# Patient Record
Sex: FEMALE | Race: WHITE | Employment: OTHER | URBAN - METROPOLITAN AREA
[De-identification: names, ages, dates, MRNs, and addresses within clinical notes are randomized per-mention and may not be internally consistent; named-entity substitution may affect disease eponyms.]

---

## 2020-07-17 ENCOUNTER — OFFICE VISIT (OUTPATIENT)
Dept: NEUROLOGY | Age: 20
End: 2020-07-17
Payer: COMMERCIAL

## 2020-07-17 VITALS — DIASTOLIC BLOOD PRESSURE: 70 MMHG | WEIGHT: 206 LBS | SYSTOLIC BLOOD PRESSURE: 106 MMHG

## 2020-07-17 PROBLEM — M79.10 MYALGIA: Status: ACTIVE | Noted: 2020-07-17

## 2020-07-17 PROBLEM — E72.59: Status: ACTIVE | Noted: 2020-07-17

## 2020-07-17 PROBLEM — M54.12 CERVICAL RADICULOPATHY: Status: ACTIVE | Noted: 2020-07-17

## 2020-07-17 PROBLEM — G44.221 CHRONIC TENSION-TYPE HEADACHE, INTRACTABLE: Status: ACTIVE | Noted: 2020-07-17

## 2020-07-17 PROCEDURE — G8421 BMI NOT CALCULATED: HCPCS | Performed by: PSYCHIATRY & NEUROLOGY

## 2020-07-17 PROCEDURE — G8427 DOCREV CUR MEDS BY ELIG CLIN: HCPCS | Performed by: PSYCHIATRY & NEUROLOGY

## 2020-07-17 PROCEDURE — 4004F PT TOBACCO SCREEN RCVD TLK: CPT | Performed by: PSYCHIATRY & NEUROLOGY

## 2020-07-17 PROCEDURE — 99204 OFFICE O/P NEW MOD 45 MIN: CPT | Performed by: PSYCHIATRY & NEUROLOGY

## 2020-07-17 RX ORDER — LITHIUM CARBONATE 300 MG/1
300 TABLET, FILM COATED, EXTENDED RELEASE ORAL 2 TIMES DAILY
COMMUNITY

## 2020-07-17 RX ORDER — DEXMETHYLPHENIDATE HYDROCHLORIDE 20 MG/1
20 CAPSULE, EXTENDED RELEASE ORAL DAILY
COMMUNITY

## 2020-07-17 RX ORDER — ESCITALOPRAM OXALATE 20 MG/1
20 TABLET ORAL DAILY
COMMUNITY

## 2020-07-17 ASSESSMENT — ENCOUNTER SYMPTOMS
SHORTNESS OF BREATH: 0
COLOR CHANGE: 0
TROUBLE SWALLOWING: 0
PHOTOPHOBIA: 0
CHOKING: 0
BACK PAIN: 0
NAUSEA: 0
VOMITING: 0

## 2020-07-17 NOTE — PROGRESS NOTES
Subjective:      Patient ID: Mariano Quiñones is a 23 y.o. female who presents today for:  Chief Complaint   Patient presents with    New Patient     numbness and pain in right side of boody cames and goes can carry anything oain in body and both shoulders all the time numbness starts in right shoulder then spreads through whole right side. HPI 42-year-old right-handed female who is here for evaluation of significant neck pain as well as shoulder girdle pain. She is having difficulty even carrying 10 pounds of weight appears going on for at least several months to a year. Patient was diagnosed with hyperprolactinemia the etiology of this is being defined. She reportedly had an MRI done at [facility] and there is no prolactinomas. This could be secondary to medications. She denies any muscle twitching or numbness or tingling of her extremities. She does have increased frequency of micturition though she reports that is her baseline. She has difficulty sleeping due to the pain. She has tension posterior headaches without any nausea vomiting. She is gained about 75 pounds in weight of recent over the years due to her hormonal issues. She has had significant breast enlargements as well. No galactorrhea has been described. Patient is seen here with her mother. Is no reports of injury or car accidents. She denies any major symptoms in her lower extremities just limited climb stairs. No past medical history on file. No past surgical history on file.   Social History     Socioeconomic History    Marital status: Single     Spouse name: Not on file    Number of children: Not on file    Years of education: Not on file    Highest education level: Not on file   Occupational History    Not on file   Social Needs    Financial resource strain: Not on file    Food insecurity     Worry: Not on file     Inability: Not on file    Transportation needs     Medical: Not on file     Non-medical: Not on file medications for this visit. Review of Systems   Constitutional: Negative for fever. HENT: Negative for ear pain, tinnitus and trouble swallowing. Eyes: Negative for photophobia and visual disturbance. Respiratory: Negative for choking and shortness of breath. Cardiovascular: Negative for chest pain and palpitations. Gastrointestinal: Negative for nausea and vomiting. Musculoskeletal: Negative for back pain, gait problem, joint swelling, myalgias, neck pain and neck stiffness. Skin: Negative for color change. Allergic/Immunologic: Negative for food allergies. Neurological: Negative for dizziness, tremors, seizures, syncope, facial asymmetry, speech difficulty, weakness, light-headedness, numbness and headaches. Psychiatric/Behavioral: Negative for behavioral problems, confusion, hallucinations and sleep disturbance. Objective:   /70 (Site: Left Upper Arm, Position: Sitting, Cuff Size: Large Adult)   Wt 206 lb (93.4 kg)     Physical Exam  Vitals signs reviewed. Eyes:      Pupils: Pupils are equal, round, and reactive to light. Neck:      Musculoskeletal: Normal range of motion. Cardiovascular:      Rate and Rhythm: Normal rate and regular rhythm. Heart sounds: No murmur. Pulmonary:      Effort: Pulmonary effort is normal.      Breath sounds: Normal breath sounds. Abdominal:      General: Bowel sounds are normal.   Musculoskeletal: Normal range of motion. Skin:     General: Skin is warm. Neurological:      Mental Status: She is alert and oriented to person, place, and time. Cranial Nerves: No cranial nerve deficit. Sensory: No sensory deficit. Motor: No abnormal muscle tone. Coordination: Coordination normal.      Deep Tendon Reflexes: Reflexes are normal and symmetric. Babinski sign absent on the right side. Babinski sign absent on the left side.    Psychiatric:         Mood and Affect: Mood normal.     Exam shows hyper HYP areflexia Placed This Encounter   Procedures    MRI Cervical Spine WO Contrast     Standing Status:   Future     Standing Expiration Date:   7/17/2021     Order Specific Question:   Reason for exam:     Answer:   r/o syrinx    CK     Standing Status:   Future     Standing Expiration Date:   7/17/2021    Lactic Acid, Plasma     Standing Status:   Future     Standing Expiration Date:   7/17/2021    Aldolase     Standing Status:   Future     Standing Expiration Date:   7/17/2021    High Sensitivity Crp     Standing Status:   Future     Standing Expiration Date:   7/17/2021    Sedimentation Rate     Standing Status:   Future     Standing Expiration Date:   7/17/2021    EMG     Standing Status:   Future     Standing Expiration Date:   9/15/2020     Order Specific Question:   Which body part? Answer:   upper/  myopathy     No orders of the defined types were placed in this encounter. Return in about 3 months (around 10/17/2020).       Mary Kay Francis MD

## 2020-08-31 ENCOUNTER — HOSPITAL ENCOUNTER (OUTPATIENT)
Dept: MRI IMAGING | Age: 20
Discharge: HOME | End: 2020-09-02
Payer: COMMERCIAL

## 2020-08-31 PROCEDURE — 72141 MRI NECK SPINE W/O DYE: CPT
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
## 2020-10-22 PROBLEM — R79.89 INCREASED PROLACTIN LEVEL: Status: ACTIVE | Noted: 2020-03-13

## 2020-10-22 PROBLEM — F32.9 MDD (MAJOR DEPRESSIVE DISORDER): Status: ACTIVE | Noted: 2018-01-07

## 2020-10-22 PROBLEM — R44.3 HALLUCINATIONS, UNSPECIFIED: Status: ACTIVE | Noted: 2018-01-07

## 2020-10-22 PROBLEM — E87.6 HYPOKALEMIA: Status: ACTIVE | Noted: 2020-03-10

## 2020-10-22 PROBLEM — F99 PSYCHIATRIC DISORDER: Status: ACTIVE | Noted: 2020-10-22

## 2020-10-22 PROBLEM — N89.8 VAGINAL DISCHARGE: Status: ACTIVE | Noted: 2020-10-22

## 2020-10-22 PROBLEM — R63.5 WEIGHT GAIN DUE TO MEDICATION: Status: ACTIVE | Noted: 2020-01-30

## 2020-10-22 PROBLEM — F12.10 CANNABIS ABUSE: Status: ACTIVE | Noted: 2020-03-13

## 2020-10-22 PROBLEM — F14.20 COCAINE DEPENDENCE, UNCOMPLICATED (HCC): Status: ACTIVE | Noted: 2018-01-07

## 2020-10-22 PROBLEM — T43.011A OVERDOSE OF TRICYCLIC ANTIDEPRESSANTS: Status: ACTIVE | Noted: 2020-03-10

## 2020-10-22 PROBLEM — T14.91XA SUICIDE ATTEMPT (HCC): Status: ACTIVE | Noted: 2017-08-06

## 2020-10-22 PROBLEM — F90.9 ATTENTION-DEFICIT HYPERACTIVITY DISORDER, UNSPECIFIED TYPE: Status: ACTIVE | Noted: 2017-08-06

## 2020-10-22 PROBLEM — M22.2X9 PATELLOFEMORAL STRESS SYNDROME: Status: ACTIVE | Noted: 2018-03-12

## 2020-10-22 PROBLEM — E03.9 HYPOTHYROIDISM: Status: ACTIVE | Noted: 2020-10-22

## 2020-10-22 PROBLEM — M25.50 PAIN IN JOINT: Status: ACTIVE | Noted: 2020-07-17

## 2020-10-22 PROBLEM — M25.361 OTHER INSTABILITY, RIGHT KNEE: Status: ACTIVE | Noted: 2020-10-22

## 2020-10-22 PROBLEM — F31.5 BIPOLAR I DISORDER, SEVERE, CURRENT OR MOST RECENT EPISODE DEPRESSED, WITH PSYCHOTIC FEATURES (HCC): Status: ACTIVE | Noted: 2018-01-07

## 2020-10-22 PROBLEM — R45.851 SUICIDAL IDEATIONS: Status: ACTIVE | Noted: 2018-01-07

## 2020-10-22 PROBLEM — F41.1 GENERALIZED ANXIETY DISORDER: Status: ACTIVE | Noted: 2018-12-05

## 2020-10-22 PROBLEM — M23.52 CHRONIC INSTABILITY OF KNEE, LEFT KNEE: Status: ACTIVE | Noted: 2018-03-12

## 2020-10-22 PROBLEM — M25.569 KNEE PAIN: Status: ACTIVE | Noted: 2020-10-22

## 2020-10-22 PROBLEM — M22.2X1 PATELLOFEMORAL DISORDERS, RIGHT KNEE: Status: ACTIVE | Noted: 2018-03-12

## 2020-10-22 PROBLEM — T42.6X2A: Status: ACTIVE | Noted: 2017-08-06

## 2020-10-22 PROBLEM — R82.81 PYURIA: Status: ACTIVE | Noted: 2020-03-14

## 2020-10-22 PROBLEM — E66.9 OBESITY, CLASS I, BMI 30-34.9: Status: ACTIVE | Noted: 2020-03-13

## 2020-10-22 PROBLEM — F31.9 BIPOLAR DISORDER, UNSPECIFIED (HCC): Status: ACTIVE | Noted: 2017-08-06

## 2020-10-22 PROBLEM — M22.2X2 PATELLOFEMORAL DISORDERS, LEFT KNEE: Status: ACTIVE | Noted: 2018-03-12

## 2020-10-22 PROBLEM — E83.39 HYPOPHOSPHATEMIA: Status: ACTIVE | Noted: 2020-03-11

## 2020-10-22 PROBLEM — T50.901A OVERDOSE: Status: ACTIVE | Noted: 2017-08-07

## 2020-10-22 PROBLEM — F60.3 BORDERLINE PERSONALITY DISORDER (HCC): Status: ACTIVE | Noted: 2019-04-19

## 2020-10-22 PROBLEM — M21.40 PES PLANUS: Status: ACTIVE | Noted: 2020-10-22

## 2020-10-22 PROBLEM — F31.5 BIPOLAR DISORD, CRNT EPSD DEPRESS, SEVERE, W PSYCH FEATURES (HCC): Status: ACTIVE | Noted: 2018-01-07

## 2020-10-22 PROBLEM — E22.1 HYPERPROLACTINEMIA (HCC): Status: ACTIVE | Noted: 2020-08-14

## 2020-10-22 PROBLEM — S93.409A SPRAIN OF ANKLE: Status: ACTIVE | Noted: 2020-10-22

## 2020-10-22 PROBLEM — M25.362 OTHER INSTABILITY, LEFT KNEE: Status: ACTIVE | Noted: 2020-10-22

## 2020-10-22 PROBLEM — M54.50 LOW BACK PAIN: Status: ACTIVE | Noted: 2020-10-22

## 2020-10-22 PROBLEM — F41.9 ANXIETY: Status: ACTIVE | Noted: 2018-01-07

## 2020-10-22 PROBLEM — D35.2 PROLACTINOMA (HCC): Status: ACTIVE | Noted: 2020-10-22

## 2020-10-22 PROBLEM — M25.369 UNSTABLE KNEE: Status: ACTIVE | Noted: 2020-10-22

## 2020-10-22 PROBLEM — T50.905A WEIGHT GAIN DUE TO MEDICATION: Status: ACTIVE | Noted: 2020-01-30

## 2020-10-22 PROBLEM — F12.20 CANNABIS DEPENDENCE, UNCOMPLICATED (HCC): Status: ACTIVE | Noted: 2018-01-07

## 2020-10-23 ENCOUNTER — OFFICE VISIT (OUTPATIENT)
Dept: NEUROLOGY | Age: 20
End: 2020-10-23
Payer: COMMERCIAL

## 2020-10-23 VITALS — WEIGHT: 203.8 LBS | DIASTOLIC BLOOD PRESSURE: 78 MMHG | SYSTOLIC BLOOD PRESSURE: 112 MMHG | HEART RATE: 78 BPM

## 2020-10-23 PROCEDURE — G8427 DOCREV CUR MEDS BY ELIG CLIN: HCPCS | Performed by: PSYCHIATRY & NEUROLOGY

## 2020-10-23 PROCEDURE — G8484 FLU IMMUNIZE NO ADMIN: HCPCS | Performed by: PSYCHIATRY & NEUROLOGY

## 2020-10-23 PROCEDURE — G8421 BMI NOT CALCULATED: HCPCS | Performed by: PSYCHIATRY & NEUROLOGY

## 2020-10-23 PROCEDURE — 99214 OFFICE O/P EST MOD 30 MIN: CPT | Performed by: PSYCHIATRY & NEUROLOGY

## 2020-10-23 PROCEDURE — 4004F PT TOBACCO SCREEN RCVD TLK: CPT | Performed by: PSYCHIATRY & NEUROLOGY

## 2020-10-23 RX ORDER — PROPRANOLOL HYDROCHLORIDE 10 MG/1
TABLET ORAL
COMMUNITY
Start: 2020-09-28

## 2020-10-23 RX ORDER — DIPHENOXYLATE HYDROCHLORIDE AND ATROPINE SULFATE 2.5; .025 MG/1; MG/1
TABLET ORAL
COMMUNITY
Start: 2020-09-21

## 2020-10-23 RX ORDER — ESCITALOPRAM OXALATE 10 MG/1
10 TABLET ORAL NIGHTLY
COMMUNITY
Start: 2020-10-12 | End: 2021-01-10

## 2020-10-23 RX ORDER — LIDOCAINE 50 MG/G
PATCH TOPICAL
COMMUNITY
Start: 2020-09-28

## 2020-10-23 RX ORDER — CABERGOLINE 0.5 MG/1
TABLET ORAL
COMMUNITY
Start: 2020-09-21

## 2020-10-23 RX ORDER — NAPROXEN 500 MG/1
TABLET ORAL
COMMUNITY
Start: 2020-09-21

## 2020-10-23 ASSESSMENT — ENCOUNTER SYMPTOMS
PHOTOPHOBIA: 0
BACK PAIN: 0
TROUBLE SWALLOWING: 0
VOMITING: 0
NAUSEA: 0
CHOKING: 0
SHORTNESS OF BREATH: 0
COLOR CHANGE: 0

## 2020-10-23 NOTE — PROGRESS NOTES
Subjective:      Patient ID: Blake Hair is a 23 y.o. female who presents today for:  Chief Complaint   Patient presents with    Follow-up     Patient needs to go over results for MRI. She is still having back pain toward the uper back. Right side of neck goes numb. She states that it is debilitating to her to the point where she is unable to drive self. She is trying to work out and it is hard for her. HPI 58-year-old right-handed female here for evaluation of significant neck pain. She has difficulty carrying 10 pounds in weight and appears to be an ongoing problem. She reportedly had an MRI at [facility] and there was no prolactinemia. Patient is gained 75 pounds in weight in recent years patient has significant breast enlargements. Since associated obtain MRI of the cervical spine which has some curvature issue but no canal stenosis is obtained and it does appear that she has gained stigmata of breast weight. She is couch is and when she plays it all pulls on her chest and the neck. Patient was on lithium which has not been discontinued  No past medical history on file. No past surgical history on file.   Social History     Socioeconomic History    Marital status: Single     Spouse name: Not on file    Number of children: Not on file    Years of education: Not on file    Highest education level: Not on file   Occupational History    Not on file   Social Needs    Financial resource strain: Not on file    Food insecurity     Worry: Not on file     Inability: Not on file    Transportation needs     Medical: Not on file     Non-medical: Not on file   Tobacco Use    Smoking status: Current Some Day Smoker    Smokeless tobacco: Never Used   Substance and Sexual Activity    Alcohol use: Not on file    Drug use: Not on file    Sexual activity: Not on file   Lifestyle    Physical activity     Days per week: Not on file     Minutes per session: Not on file    Stress: Not on file   Relationships  Social connections     Talks on phone: Not on file     Gets together: Not on file     Attends Baptism service: Not on file     Active member of club or organization: Not on file     Attends meetings of clubs or organizations: Not on file     Relationship status: Not on file    Intimate partner violence     Fear of current or ex partner: Not on file     Emotionally abused: Not on file     Physically abused: Not on file     Forced sexual activity: Not on file   Other Topics Concern    Not on file   Social History Narrative    Not on file     No family history on file. Allergies   Allergen Reactions    Codeine Nausea And Vomiting    Fluoxetine      Other reaction(s): suicidal    Lurasidone Other (See Comments)     Other reaction(s): over dose/suicidal  suicidal       Quetiapine Other (See Comments)    Sertraline      Other reaction(s): suicidal    Bupropion Other (See Comments)     Made her want her to kill herself    Other reaction(s): Other: See Comments  Made her want her to kill herself    Fluoxetine Hcl Other (See Comments)     causes depression per mom    Lamotrigine      Other reaction(s): Mental Status Change  Suicidal      Nortriptyline      Other reaction(s): Other: See Comments    Paroxetine     Paroxetine Hcl Other (See Comments)     Tardive dyskinesia    Sertraline Hcl Other (See Comments)     causes depression per mom       Current Outpatient Medications   Medication Sig Dispense Refill    naproxen (NAPROSYN) 500 MG tablet Take 1 tablet by mouth twice daily as needed (for pain). Take with food.  cabergoline (DOSTINEX) 0.5 MG tablet Take 1/2 tablet by mouth every Sunday and Wednesday.  lidocaine (LIDODERM) 5 % Place onto the skin      Multiple Vitamin (MULTI-VITAMINS) TABS Take 1 tablet by mouth once daily.       propranolol (INDERAL) 10 MG tablet Take by mouth      escitalopram (LEXAPRO) 10 MG tablet Take 10 mg by mouth nightly      Dexmethylphenidate HCl ER [facility] Motor: No abnormal muscle tone. Coordination: Coordination normal.      Deep Tendon Reflexes: Reflexes are normal and symmetric. Babinski sign absent on the right side. Babinski sign absent on the left side. Psychiatric:         Mood and Affect: Mood normal.         Mri Cervical Spine Wo Contrast    Result Date: 8/31/2020  MRI of the cervical spine HISTORY: Neck pain radiating to the mid thoracic spine. Right arm and neck numbness. COMPARISON: No prior imaging of the cervical spine available for correlation. TECHNIQUE: Sagittal T1, T2, and inversion recovery. Axial gradient echo and T2. Coronal T1. FINDINGS: Cervical spinal canal is at the lower limits of normal in size on a congenital basis. Cervical cord has a normal signal and normal bulging. Craniocervical junction is unremarkable without narrowing or cerebellar tonsillar ectopia. Dextroscoliosis of the cervical thoracic junction. There may be a levoscoliosis of the more inferior thoracic spine that is not included on the study. C2-3 (C2): Unremarkable with no disc degeneration, canal or foraminal narrowing. C3-4 (C3): Subtle disc bulging but otherwise unremarkable with no significant degenerative disc changes, canal or foraminal narrowing. C4-5 (C4): Subtle disc space narrowing. No significant desiccation. Tiny focus of increased signal at the posterior central margin of the minimally bulging disc compatible with trivial annular tear. No significant canal or foraminal stenosis. C5-6 (C5): Subtle disc space narrowing and posterior bulging without significant mass effect on the thecal sac, canal or foraminal stenosis. C6-7 (C6): Disc space height and degree of hydration maintained. Subtle slightly eccentric towards the right bulging of the disc without significant canal or foraminal stenosis. C7-T1 (C7): Disc space height maintained. No significant canal stenosis. Mild right greater than left facet joint arthrosis without significant foraminal narrowing. not have a pituitary enlargement. Next patient's gain is secondary to antipsychotic she has discontinued the lithium recommended that she be careful on this as she can have significant relapse with bipolar state. Plan:      No orders of the defined types were placed in this encounter. No orders of the defined types were placed in this encounter. No follow-ups on file.       Beth Rosas MD

## 2021-03-10 PROBLEM — Z86.59 HISTORY OF MENTAL DISORDER: Status: ACTIVE | Noted: 2021-03-10

## 2021-03-10 PROBLEM — N62 LARGE BREASTS: Status: ACTIVE | Noted: 2021-03-10

## 2021-03-10 PROBLEM — R53.1 ASTHENIA: Status: ACTIVE | Noted: 2021-03-10

## 2021-03-10 PROBLEM — T50.905A ADVERSE REACTION TO DRUG: Status: ACTIVE | Noted: 2017-08-07

## 2021-03-10 PROBLEM — M25.519 SHOULDER PAIN: Status: ACTIVE | Noted: 2021-03-10

## 2023-03-09 ENCOUNTER — OFFICE VISIT (OUTPATIENT)
Dept: PRIMARY CARE | Facility: CLINIC | Age: 23
End: 2023-03-09
Payer: COMMERCIAL

## 2023-03-09 VITALS
OXYGEN SATURATION: 97 % | WEIGHT: 172 LBS | SYSTOLIC BLOOD PRESSURE: 118 MMHG | HEART RATE: 102 BPM | BODY MASS INDEX: 25.4 KG/M2 | DIASTOLIC BLOOD PRESSURE: 66 MMHG | RESPIRATION RATE: 16 BRPM | TEMPERATURE: 97.7 F

## 2023-03-09 DIAGNOSIS — B00.1 COLD SORE: Primary | ICD-10-CM

## 2023-03-09 PROCEDURE — 1036F TOBACCO NON-USER: CPT | Performed by: NURSE PRACTITIONER

## 2023-03-09 PROCEDURE — 99213 OFFICE O/P EST LOW 20 MIN: CPT | Performed by: NURSE PRACTITIONER

## 2023-03-09 RX ORDER — CLONIDINE HYDROCHLORIDE 0.2 MG/1
0.2 TABLET ORAL NIGHTLY
COMMUNITY
Start: 2023-02-13

## 2023-03-09 RX ORDER — ESCITALOPRAM OXALATE 10 MG/1
TABLET ORAL
COMMUNITY

## 2023-03-09 RX ORDER — BETAMETHASONE DIPROPIONATE 0.5 MG/G
CREAM TOPICAL 2 TIMES DAILY
COMMUNITY
Start: 2021-10-21 | End: 2023-06-16

## 2023-03-09 RX ORDER — NAPROXEN 500 MG/1
TABLET ORAL
COMMUNITY

## 2023-03-09 RX ORDER — METHOCARBAMOL 500 MG/1
500 TABLET, FILM COATED ORAL 2 TIMES DAILY PRN
COMMUNITY
Start: 2022-06-01 | End: 2023-06-16 | Stop reason: ALTCHOICE

## 2023-03-09 RX ORDER — SPIRONOLACTONE 50 MG/1
1 TABLET, FILM COATED ORAL 2 TIMES DAILY
COMMUNITY
Start: 2023-02-08

## 2023-03-09 RX ORDER — LINACLOTIDE 290 UG/1
1 CAPSULE, GELATIN COATED ORAL DAILY
COMMUNITY
Start: 2021-04-28 | End: 2024-03-20 | Stop reason: SDUPTHER

## 2023-03-09 RX ORDER — CABERGOLINE 0.5 MG/1
0.5 TABLET ORAL
COMMUNITY

## 2023-03-09 RX ORDER — PANTOPRAZOLE SODIUM 40 MG/1
1 TABLET, DELAYED RELEASE ORAL 2 TIMES DAILY
COMMUNITY
Start: 2022-05-23 | End: 2023-06-16 | Stop reason: ALTCHOICE

## 2023-03-09 RX ORDER — ESCITALOPRAM OXALATE 20 MG/1
TABLET ORAL
COMMUNITY

## 2023-03-09 RX ORDER — DEXMETHYLPHENIDATE HYDROCHLORIDE 30 MG/1
1 CAPSULE, EXTENDED RELEASE ORAL DAILY
COMMUNITY
Start: 2023-02-24 | End: 2023-10-26

## 2023-03-09 RX ORDER — VALACYCLOVIR HYDROCHLORIDE 1 G/1
2000 TABLET, FILM COATED ORAL 2 TIMES DAILY
Qty: 4 TABLET | Refills: 0 | Status: SHIPPED | OUTPATIENT
Start: 2023-03-09 | End: 2023-03-16 | Stop reason: SDUPTHER

## 2023-03-09 RX ORDER — METFORMIN HYDROCHLORIDE 750 MG/1
750 TABLET, EXTENDED RELEASE ORAL 2 TIMES DAILY
COMMUNITY
Start: 2023-02-21

## 2023-03-09 RX ORDER — MULTIVITAMIN
1 TABLET ORAL DAILY
COMMUNITY
Start: 2020-02-20 | End: 2023-06-20 | Stop reason: SDUPTHER

## 2023-03-09 RX ORDER — BUSPIRONE HYDROCHLORIDE 10 MG/1
TABLET ORAL
COMMUNITY
End: 2023-06-16

## 2023-03-09 RX ORDER — ASENAPINE 5 MG/1
TABLET SUBLINGUAL
COMMUNITY

## 2023-03-09 ASSESSMENT — ENCOUNTER SYMPTOMS
SORE THROAT: 0
HEADACHES: 0
APPETITE CHANGE: 0
ACTIVITY CHANGE: 0
SINUS PAIN: 0
SINUS PRESSURE: 0
RHINORRHEA: 0

## 2023-03-09 NOTE — ASSESSMENT & PLAN NOTE
Oral antiviral given; Take as directed  Can use tylenol or motrin as needed for pain  Follow up with PCP if not improving

## 2023-03-09 NOTE — PROGRESS NOTES
Subjective   Patient ID: Ayesha Jean is a 23 y.o. female who presents for Rash.    L side of mouth  X3 weeks  Has hx of cold sores  Only 2nd flare up  Tried abreva; had an allergic reaction  Lala bees OTC; did not help  No fever or chills  2 days ago-feels sob  Hard taking a deep breath  Denies any cold symptoms  Denies any hx of asthma    Does not smoke or vape      Rash  This is a new problem. The current episode started 1 to 4 weeks ago. The affected locations include the lips. The rash is characterized by blistering. Pertinent negatives include no rhinorrhea or sore throat.        Review of Systems   Constitutional:  Negative for activity change and appetite change.   HENT:  Positive for mouth sores. Negative for postnasal drip, rhinorrhea, sinus pressure, sinus pain and sore throat.    Skin:  Positive for rash.   Neurological:  Negative for headaches.       Objective   /66   Pulse 102   Temp 36.5 °C (97.7 °F) (Temporal)   Resp 16   Wt 78 kg (172 lb)   SpO2 97%   BMI 25.40 kg/m²     Physical Exam  Vitals reviewed.   Constitutional:       Appearance: Normal appearance.   HENT:      Head: Normocephalic.      Right Ear: Tympanic membrane, ear canal and external ear normal.      Left Ear: Tympanic membrane, ear canal and external ear normal.      Nose: Nose normal.      Mouth/Throat:      Lips: Pink. Lesions present.      Mouth: Mucous membranes are moist.      Pharynx: Oropharynx is clear.      Comments: L mouth several vesicles  Some crusted over    Eyes:      Extraocular Movements: Extraocular movements intact.      Pupils: Pupils are equal, round, and reactive to light.   Cardiovascular:      Rate and Rhythm: Normal rate and regular rhythm.      Pulses: Normal pulses.      Heart sounds: Normal heart sounds.   Musculoskeletal:      Cervical back: Normal range of motion and neck supple.   Skin:     General: Skin is warm.   Neurological:      Mental Status: She is alert and oriented to person, place,  and time.   Psychiatric:         Mood and Affect: Mood normal.         Behavior: Behavior normal.         Assessment/Plan   Problem List Items Addressed This Visit          Infectious/Inflammatory    Cold sore - Primary     Oral antiviral given; Take as directed  Can use tylenol or motrin as needed for pain  Follow up with PCP if not improving           Relevant Medications    valACYclovir (Valtrex) 1 gram tablet

## 2023-03-16 ENCOUNTER — OFFICE VISIT (OUTPATIENT)
Dept: PRIMARY CARE | Facility: CLINIC | Age: 23
End: 2023-03-16
Payer: COMMERCIAL

## 2023-03-16 ENCOUNTER — TELEPHONE (OUTPATIENT)
Dept: PRIMARY CARE | Facility: CLINIC | Age: 23
End: 2023-03-16

## 2023-03-16 VITALS
RESPIRATION RATE: 14 BRPM | BODY MASS INDEX: 25.55 KG/M2 | HEIGHT: 69 IN | WEIGHT: 172.5 LBS | HEART RATE: 60 BPM | TEMPERATURE: 97.6 F | DIASTOLIC BLOOD PRESSURE: 70 MMHG | SYSTOLIC BLOOD PRESSURE: 104 MMHG

## 2023-03-16 DIAGNOSIS — B00.1 COLD SORE: ICD-10-CM

## 2023-03-16 PROCEDURE — 1036F TOBACCO NON-USER: CPT | Performed by: NURSE PRACTITIONER

## 2023-03-16 PROCEDURE — 99213 OFFICE O/P EST LOW 20 MIN: CPT | Performed by: NURSE PRACTITIONER

## 2023-03-16 RX ORDER — CLONIDINE HYDROCHLORIDE 0.1 MG/1
0.1 TABLET ORAL NIGHTLY
COMMUNITY
Start: 2022-10-17 | End: 2023-06-16 | Stop reason: ALTCHOICE

## 2023-03-16 RX ORDER — METFORMIN HYDROCHLORIDE 500 MG/1
500 TABLET, EXTENDED RELEASE ORAL
COMMUNITY
Start: 2022-08-16 | End: 2023-06-16 | Stop reason: ALTCHOICE

## 2023-03-16 RX ORDER — VALACYCLOVIR HYDROCHLORIDE 500 MG/1
500 TABLET, FILM COATED ORAL DAILY
Qty: 90 TABLET | Refills: 0 | Status: SHIPPED | OUTPATIENT
Start: 2023-03-16 | End: 2023-06-20 | Stop reason: SDUPTHER

## 2023-03-16 RX ORDER — LINACLOTIDE 145 UG/1
145 CAPSULE, GELATIN COATED ORAL
COMMUNITY
Start: 2022-04-24 | End: 2023-06-16 | Stop reason: ALTCHOICE

## 2023-03-16 ASSESSMENT — ENCOUNTER SYMPTOMS
EYE DISCHARGE: 0
EYE PAIN: 1
EYE ITCHING: 1
NECK PAIN: 0
HEADACHES: 1
VOMITING: 0
ABDOMINAL PAIN: 0
NAUSEA: 0
DIARRHEA: 0
WHEEZING: 0
FEVER: 0
EYE REDNESS: 0
SORE THROAT: 1
CONSTIPATION: 0
COUGH: 0

## 2023-03-16 NOTE — PROGRESS NOTES
"Subjective   Patient ID: Ayesha Jean is a 23 y.o. female who presents for Mouth Lesions.    Mouth Lesions   The current episode started more than 1 week ago. The onset was gradual. The problem occurs continuously. The problem has been unchanged. Nothing relieves the symptoms. Associated symptoms include eye itching, congestion, ear pain, headaches, mouth sores, sore throat, muscle aches and eye pain. Pertinent negatives include no fever, no abdominal pain, no constipation, no diarrhea, no nausea, no vomiting, no ear discharge, no neck pain, no neck stiffness, no cough, no wheezing, no rash, no eye discharge and no eye redness.        Review of Systems   Constitutional:  Negative for fever.   HENT:  Positive for congestion, ear pain, mouth sores and sore throat. Negative for ear discharge.    Eyes:  Positive for pain and itching. Negative for discharge and redness.   Respiratory:  Negative for cough and wheezing.    Gastrointestinal:  Negative for abdominal pain, constipation, diarrhea, nausea and vomiting.   Musculoskeletal:  Negative for neck pain.   Skin:  Negative for rash.   Neurological:  Positive for headaches.       Objective   /70 (BP Location: Left leg, Patient Position: Sitting, BP Cuff Size: Adult)   Pulse 60   Temp 36.4 °C (97.6 °F) (Temporal)   Resp 14   Ht 1.753 m (5' 9\")   Wt 78.2 kg (172 lb 8 oz)   BMI 25.47 kg/m²     Physical Exam    Assessment/Plan          "

## 2023-03-16 NOTE — TELEPHONE ENCOUNTER
[phone]  Patient was seen 3/9/23 for a cold sore.  States that it is healing with help from the medication that was given.  Said that it looks like it is now spreading to the other side of her mouth.  She has piercings on that side and she doesn't know what she should do.  Please give her a call back at the number above.

## 2023-03-16 NOTE — PROGRESS NOTES
"Subjective   Patient ID: Ayesha Jean is a 23 y.o. female who presents for Mouth Lesions.  HPI  [facility]  Review of Systems    Objective     /70 (BP Location: Left leg, Patient Position: Sitting, BP Cuff Size: Adult)   Pulse 60   Temp 36.4 °C (97.6 °F) (Temporal)   Resp 14   Ht 1.753 m (5' 9\")   Wt 78.2 kg (172 lb 8 oz)   BMI 25.47 kg/m²      Physical Exam    Assessment/Plan   Problem List Items Addressed This Visit          Infectious/Inflammatory    RESOLVED: Cold sore          "

## 2023-03-17 ENCOUNTER — APPOINTMENT (OUTPATIENT)
Dept: PRIMARY CARE | Facility: CLINIC | Age: 23
End: 2023-03-17
Payer: COMMERCIAL

## 2023-03-17 NOTE — PROGRESS NOTES
"Subjective   Patient ID: Ayesha Jean is a 23 y.o. female who presents for Mouth Lesions.    Pt. Is being seen for cold sores to lips that she feels are spreading.  She was seen last week and prescribed 2 days of Valtrex which she completed.  She now has new areas developing, pain when opening her mouth. She is also complaining of generalized malaise and body aches, no fever    Mouth Lesions   The current episode started more than 1 week ago. The onset was gradual. The problem occurs continuously. The problem has been unchanged. Nothing relieves the symptoms. Associated symptoms include eye itching, congestion, ear pain, headaches, sore throat, muscle aches and eye pain. Pertinent negatives include no fever, no abdominal pain, no constipation, no diarrhea, no nausea, no vomiting, no ear discharge, no neck pain, no neck stiffness, no cough, no wheezing, no rash, no eye discharge and no eye redness.        Review of Systems   Constitutional:  Negative for fever.   HENT:  Positive for congestion, ear pain, mouth sores and sore throat. Negative for ear discharge.    Eyes:  Positive for pain and itching. Negative for discharge and redness.   Respiratory:  Negative for cough and wheezing.    Gastrointestinal:  Negative for abdominal pain, constipation, diarrhea, nausea and vomiting.   Musculoskeletal:  Negative for neck pain.   Skin:  Negative for rash.   Neurological:  Positive for headaches.       Objective   /70 (BP Location: Left leg, Patient Position: Sitting, BP Cuff Size: Adult)   Pulse 60   Temp 36.4 °C (97.6 °F) (Temporal)   Resp 14   Ht 1.753 m (5' 9\")   Wt 78.2 kg (172 lb 8 oz)   BMI 25.47 kg/m²     Physical Exam  Constitutional:       General: She is not in acute distress.     Appearance: She is not ill-appearing.   HENT:      Head: Normocephalic and atraumatic.      Mouth/Throat:      Lips: Lesions present.      Mouth: Mucous membranes are moist.      Pharynx: Oropharynx is clear.   Eyes:      " Conjunctiva/sclera: Conjunctivae normal.      Pupils: Pupils are equal, round, and reactive to light.   Cardiovascular:      Rate and Rhythm: Normal rate and regular rhythm.      Pulses: Normal pulses.      Heart sounds: Normal heart sounds.   Pulmonary:      Effort: Pulmonary effort is normal. No respiratory distress.      Breath sounds: Normal breath sounds.   Abdominal:      General: Bowel sounds are normal.      Palpations: Abdomen is soft.      Tenderness: There is no abdominal tenderness.   Musculoskeletal:         General: No tenderness. Normal range of motion.      Cervical back: Normal range of motion and neck supple.   Skin:     General: Skin is warm and dry.      Findings: Rash present. Rash is pustular and vesicular.             Comments: Right side of lip   Neurological:      General: No focal deficit present.      Mental Status: She is alert and oriented to person, place, and time.   Psychiatric:         Mood and Affect: Mood normal.         Behavior: Behavior normal.         Thought Content: Thought content normal.         Judgment: Judgment normal.         Assessment/Plan   Problem List Items Addressed This Visit          Infectious/Inflammatory    RESOLVED: Cold sore    Relevant Medications    valACYclovir (Valtrex) 500 mg tablet

## 2023-03-17 NOTE — PATIENT INSTRUCTIONS
Pt started on 500 mg valtrex daily.  Pt. Instructed to call office if lesions do not resolve or are getting worse.  Educated on ways to prevent spreading. Follow-up with PCP in 3 months, or sooner if needed. Call the office if any problems or concerns in the meantime.

## 2023-03-19 PROBLEM — T50.905A MEDICATION REACTION: Status: ACTIVE | Noted: 2023-03-19

## 2023-03-19 PROBLEM — R35.0 INCREASED URINARY FREQUENCY: Status: ACTIVE | Noted: 2023-03-19

## 2023-03-19 PROBLEM — M75.50 SCAPULOTHORACIC BURSITIS: Status: ACTIVE | Noted: 2023-03-19

## 2023-03-19 PROBLEM — Z98.890 STATUS POST BILATERAL BREAST REDUCTION: Status: ACTIVE | Noted: 2023-03-19

## 2023-03-19 PROBLEM — G89.29 CHRONIC PAIN OF BOTH KNEES: Status: ACTIVE | Noted: 2023-03-19

## 2023-03-19 PROBLEM — R53.83 MALAISE AND FATIGUE: Status: ACTIVE | Noted: 2023-03-19

## 2023-03-19 PROBLEM — R63.5 ABNORMAL WEIGHT GAIN: Status: ACTIVE | Noted: 2023-03-19

## 2023-03-19 PROBLEM — K59.09 CHRONIC CONSTIPATION: Status: ACTIVE | Noted: 2023-03-19

## 2023-03-19 PROBLEM — L25.9 CONTACT DERMATITIS: Status: ACTIVE | Noted: 2023-03-19

## 2023-03-19 PROBLEM — M54.6 THORACIC BACK PAIN: Status: ACTIVE | Noted: 2023-03-19

## 2023-03-19 PROBLEM — M25.512 BILATERAL SHOULDER PAIN: Status: ACTIVE | Noted: 2023-03-19

## 2023-03-19 PROBLEM — R06.00 DYSPNEA: Status: ACTIVE | Noted: 2023-03-19

## 2023-03-19 PROBLEM — F31.9 BIPOLAR DEPRESSION (MULTI): Status: ACTIVE | Noted: 2023-03-19

## 2023-03-19 PROBLEM — E03.9 HYPOTHYROIDISM: Status: ACTIVE | Noted: 2023-03-19

## 2023-03-19 PROBLEM — M21.40 FLAT FOOT: Status: ACTIVE | Noted: 2023-03-19

## 2023-03-19 PROBLEM — E28.2 PCOS (POLYCYSTIC OVARIAN SYNDROME): Status: ACTIVE | Noted: 2023-03-19

## 2023-03-19 PROBLEM — R09.82 POST-NASAL DRIP: Status: ACTIVE | Noted: 2023-03-19

## 2023-03-19 PROBLEM — J06.9 URI (UPPER RESPIRATORY INFECTION): Status: ACTIVE | Noted: 2023-03-19

## 2023-03-19 PROBLEM — U07.1 COVID-19: Status: ACTIVE | Noted: 2023-03-19

## 2023-03-19 PROBLEM — F43.10 PTSD (POST-TRAUMATIC STRESS DISORDER): Status: ACTIVE | Noted: 2023-03-19

## 2023-03-19 PROBLEM — R39.9 UTI SYMPTOMS: Status: ACTIVE | Noted: 2023-03-19

## 2023-03-19 PROBLEM — M25.369 KNEE INSTABILITY: Status: ACTIVE | Noted: 2023-03-19

## 2023-03-19 PROBLEM — M22.2X9 PFS (PATELLOFEMORAL SYNDROME): Status: ACTIVE | Noted: 2023-03-19

## 2023-03-19 PROBLEM — S99.919A ANKLE INJURY: Status: ACTIVE | Noted: 2023-03-19

## 2023-03-19 PROBLEM — M25.562 CHRONIC PAIN OF BOTH KNEES: Status: ACTIVE | Noted: 2023-03-19

## 2023-03-19 PROBLEM — Z04.9 CONDITION NOT FOUND: Status: ACTIVE | Noted: 2023-03-19

## 2023-03-19 PROBLEM — M25.50 JOINT PAIN: Status: ACTIVE | Noted: 2023-03-19

## 2023-03-19 PROBLEM — D35.2 PROLACTINOMA (MULTI): Status: ACTIVE | Noted: 2023-03-19

## 2023-03-19 PROBLEM — M25.362 PATELLAR INSTABILITY OF LEFT KNEE: Status: ACTIVE | Noted: 2023-03-19

## 2023-03-19 PROBLEM — R53.1 WEAKNESS: Status: ACTIVE | Noted: 2023-03-19

## 2023-03-19 PROBLEM — S13.9XXA CERVICAL SPRAIN: Status: ACTIVE | Noted: 2023-03-19

## 2023-03-19 PROBLEM — B34.9 VIRAL SYNDROME: Status: ACTIVE | Noted: 2023-03-19

## 2023-03-19 PROBLEM — M25.361 PATELLAR INSTABILITY OF RIGHT KNEE: Status: ACTIVE | Noted: 2023-03-19

## 2023-03-19 PROBLEM — F90.9 ATTENTION DEFICIT HYPERACTIVITY DISORDER (ADHD): Status: ACTIVE | Noted: 2023-03-19

## 2023-03-19 PROBLEM — M79.10 MYALGIA: Status: ACTIVE | Noted: 2023-03-19

## 2023-03-19 PROBLEM — R30.0 DYSURIA: Status: ACTIVE | Noted: 2023-03-19

## 2023-03-19 PROBLEM — R51.9 GENERALIZED HEADACHES: Status: ACTIVE | Noted: 2023-03-19

## 2023-03-19 PROBLEM — S93.409A ANKLE SPRAIN: Status: ACTIVE | Noted: 2023-03-19

## 2023-03-19 PROBLEM — R53.81 MALAISE AND FATIGUE: Status: ACTIVE | Noted: 2023-03-19

## 2023-03-19 PROBLEM — M25.511 BILATERAL SHOULDER PAIN: Status: ACTIVE | Noted: 2023-03-19

## 2023-03-19 PROBLEM — M54.2 NECK PAIN: Status: ACTIVE | Noted: 2023-03-19

## 2023-03-19 PROBLEM — B34.9 VIRAL SYNDROME: Status: RESOLVED | Noted: 2023-03-19 | Resolved: 2023-03-19

## 2023-03-19 PROBLEM — M25.561 CHRONIC PAIN OF BOTH KNEES: Status: ACTIVE | Noted: 2023-03-19

## 2023-03-19 PROBLEM — A64 SEXUALLY TRANSMITTED DISEASE (STD): Status: ACTIVE | Noted: 2023-03-19

## 2023-03-19 PROBLEM — M54.50 LOWER BACK PAIN: Status: ACTIVE | Noted: 2023-03-19

## 2023-03-19 PROBLEM — J02.9 SORE THROAT: Status: ACTIVE | Noted: 2023-03-19

## 2023-03-19 PROBLEM — M47.22 OSTEOARTHRITIS OF SPINE WITH RADICULOPATHY, CERVICAL REGION: Status: ACTIVE | Noted: 2023-03-19

## 2023-03-19 PROBLEM — R09.81 NASAL CONGESTION: Status: ACTIVE | Noted: 2023-03-19

## 2023-03-19 PROBLEM — N89.8 VAGINAL DISCHARGE: Status: ACTIVE | Noted: 2023-03-19

## 2023-03-19 RX ORDER — LIDOCAINE 50 MG/G
1 PATCH TOPICAL DAILY
COMMUNITY

## 2023-03-19 RX ORDER — POLYETHYLENE GLYCOL 3350 17 G/17G
17 POWDER, FOR SOLUTION ORAL 2 TIMES DAILY PRN
COMMUNITY

## 2023-03-19 RX ORDER — ERGOCALCIFEROL 1.25 MG/1
1 CAPSULE ORAL
COMMUNITY

## 2023-03-19 RX ORDER — FLUTICASONE PROPIONATE 50 MCG
2 SPRAY, SUSPENSION (ML) NASAL DAILY
COMMUNITY
Start: 2022-10-10

## 2023-03-19 RX ORDER — CLOTRIMAZOLE 1 %
1 CREAM (GRAM) TOPICAL 2 TIMES DAILY PRN
COMMUNITY
Start: 2021-10-21 | End: 2023-06-16 | Stop reason: ALTCHOICE

## 2023-03-19 RX ORDER — PSYLLIUM HUSK 100 %
POWDER (GRAM) MISCELLANEOUS DAILY
COMMUNITY
End: 2023-06-16 | Stop reason: ALTCHOICE

## 2023-03-27 ENCOUNTER — TELEPHONE (OUTPATIENT)
Dept: PRIMARY CARE | Facility: CLINIC | Age: 23
End: 2023-03-27

## 2023-03-27 ENCOUNTER — OFFICE VISIT (OUTPATIENT)
Dept: PRIMARY CARE | Facility: CLINIC | Age: 23
End: 2023-03-27
Payer: COMMERCIAL

## 2023-03-27 VITALS
HEART RATE: 85 BPM | BODY MASS INDEX: 25.25 KG/M2 | TEMPERATURE: 98.9 F | SYSTOLIC BLOOD PRESSURE: 110 MMHG | OXYGEN SATURATION: 97 % | WEIGHT: 171 LBS | DIASTOLIC BLOOD PRESSURE: 74 MMHG

## 2023-03-27 DIAGNOSIS — L01.00 IMPETIGO: ICD-10-CM

## 2023-03-27 DIAGNOSIS — B00.1 RECURRENT COLD SORES: Primary | ICD-10-CM

## 2023-03-27 PROBLEM — E22.1 HYPERPROLACTINEMIA (MULTI): Status: ACTIVE | Noted: 2020-08-14

## 2023-03-27 PROBLEM — E87.6 HYPOKALEMIA: Status: ACTIVE | Noted: 2020-03-10

## 2023-03-27 PROBLEM — T50.905A WEIGHT GAIN DUE TO MEDICATION: Status: ACTIVE | Noted: 2020-01-30

## 2023-03-27 PROBLEM — F60.3 BORDERLINE PERSONALITY DISORDER (MULTI): Status: ACTIVE | Noted: 2019-04-19

## 2023-03-27 PROBLEM — E83.39 HYPOPHOSPHATEMIA: Status: ACTIVE | Noted: 2020-03-11

## 2023-03-27 PROBLEM — R82.81 PYURIA: Status: ACTIVE | Noted: 2020-03-14

## 2023-03-27 PROBLEM — F14.20 COCAINE DEPENDENCE, UNCOMPLICATED (MULTI): Status: ACTIVE | Noted: 2018-01-07
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
## 2023-03-27 PROBLEM — M25.519 SHOULDER PAIN: Status: ACTIVE | Noted: 2021-03-10

## 2023-03-27 PROBLEM — R45.851 SUICIDAL IDEATIONS: Status: ACTIVE | Noted: 2018-01-07

## 2023-03-27 PROBLEM — E66.9 OBESITY, CLASS I, BMI 30-34.9: Status: ACTIVE | Noted: 2020-03-13

## 2023-03-27 PROBLEM — F12.20 CANNABIS DEPENDENCE, UNCOMPLICATED (MULTI): Status: ACTIVE | Noted: 2018-01-07

## 2023-03-27 PROBLEM — Z86.59 HISTORY OF MENTAL DISORDER: Status: ACTIVE | Noted: 2021-03-10

## 2023-03-27 PROBLEM — E66.811 OBESITY, CLASS I, BMI 30-34.9: Status: ACTIVE | Noted: 2020-03-13

## 2023-03-27 PROBLEM — R44.3 HALLUCINATIONS, UNSPECIFIED: Status: ACTIVE | Noted: 2018-01-07

## 2023-03-27 PROBLEM — F12.10 CANNABIS ABUSE: Status: ACTIVE | Noted: 2020-03-13

## 2023-03-27 PROBLEM — G44.221 CHRONIC TENSION-TYPE HEADACHE, INTRACTABLE: Status: ACTIVE | Noted: 2020-07-17

## 2023-03-27 PROBLEM — N62 LARGE BREASTS: Status: ACTIVE | Noted: 2021-03-10

## 2023-03-27 PROBLEM — F31.5: Status: ACTIVE | Noted: 2018-01-07

## 2023-03-27 PROBLEM — F41.9 ANXIETY: Status: ACTIVE | Noted: 2018-01-07

## 2023-03-27 PROBLEM — F99 PSYCHIATRIC DISORDER: Status: ACTIVE | Noted: 2020-10-22

## 2023-03-27 PROBLEM — E72.59: Status: ACTIVE | Noted: 2020-07-17

## 2023-03-27 PROBLEM — F32.9 MDD (MAJOR DEPRESSIVE DISORDER): Status: ACTIVE | Noted: 2018-01-07

## 2023-03-27 PROBLEM — R63.5 WEIGHT GAIN DUE TO MEDICATION: Status: ACTIVE | Noted: 2020-01-30

## 2023-03-27 PROBLEM — S93.409A SPRAIN OF ANKLE: Status: ACTIVE | Noted: 2020-10-22

## 2023-03-27 PROBLEM — T43.011A OVERDOSE OF TRICYCLIC ANTIDEPRESSANTS: Status: ACTIVE | Noted: 2020-03-10

## 2023-03-27 PROBLEM — T14.91XA SUICIDE ATTEMPT (MULTI): Status: ACTIVE | Noted: 2017-08-06

## 2023-03-27 PROCEDURE — 99213 OFFICE O/P EST LOW 20 MIN: CPT | Performed by: INTERNAL MEDICINE

## 2023-03-27 PROCEDURE — 1036F TOBACCO NON-USER: CPT | Performed by: INTERNAL MEDICINE

## 2023-03-27 RX ORDER — DEXMETHYLPHENIDATE HYDROCHLORIDE 10 MG/1
10 TABLET ORAL DAILY
COMMUNITY
End: 2023-10-26

## 2023-03-27 RX ORDER — DEXMETHYLPHENIDATE HYDROCHLORIDE 30 MG/1
CAPSULE, EXTENDED RELEASE ORAL
COMMUNITY
End: 2023-03-27 | Stop reason: SDUPTHER

## 2023-03-27 RX ORDER — CEPHALEXIN 500 MG/1
500 CAPSULE ORAL 3 TIMES DAILY
Qty: 21 CAPSULE | Refills: 0 | Status: SHIPPED | OUTPATIENT
Start: 2023-03-27 | End: 2023-04-03

## 2023-03-27 RX ORDER — MUPIROCIN CALCIUM 20 MG/G
CREAM TOPICAL 3 TIMES DAILY
Qty: 15 G | Refills: 0 | Status: SHIPPED | OUTPATIENT
Start: 2023-03-27 | End: 2023-04-06

## 2023-03-27 ASSESSMENT — ENCOUNTER SYMPTOMS
COUGH: 0
FEVER: 0
SHORTNESS OF BREATH: 0
FATIGUE: 0

## 2023-03-27 NOTE — PROGRESS NOTES
Subjective   Ayesha Jean is a 23 y.o. female who presents for No chief complaint on file..    Mouth Lesions   Episode onset: 1 month. Associated symptoms include mouth sores. Pertinent negatives include no fever and no cough.        Review of Systems   Constitutional:  Negative for fatigue and fever.   HENT:  Positive for mouth sores.    Respiratory:  Negative for cough and shortness of breath.    Cardiovascular:  Negative for chest pain and leg swelling.   All other systems reviewed and are negative.      Health Maintenance Due   Topic Date Due    Yearly Adult Physical  Never done    Hepatitis A Vaccines (1 of 2 - Risk 2-dose series) Never done    MMR Vaccines (1 of 1 - Standard series) Never done    Varicella Vaccines (1 of 2 - 2-dose childhood series) Never done    Hepatitis B Vaccines (3 of 3 - 3-dose series) 09/12/2000    HPV Vaccines (1 - 2-dose series) Never done    Hepatitis C Screening  Never done    Cervical Cancer Screening  Never done       Objective   /74   Pulse 85   Temp 37.2 °C (98.9 °F)   Wt 77.6 kg (171 lb)   SpO2 97%   BMI 25.25 kg/m²     Physical Exam  Vitals and nursing note reviewed.   Constitutional:       Appearance: Normal appearance.   HENT:      Head: Normocephalic.   Eyes:      Conjunctiva/sclera: Conjunctivae normal.      Pupils: Pupils are equal, round, and reactive to light.   Cardiovascular:      Rate and Rhythm: Normal rate and regular rhythm.      Pulses: Normal pulses.      Heart sounds: Normal heart sounds.   Pulmonary:      Effort: Pulmonary effort is normal.      Breath sounds: Normal breath sounds.   Musculoskeletal:         General: No swelling.      Cervical back: Neck supple.   Skin:     General: Skin is warm and dry.   Neurological:      General: No focal deficit present.      Mental Status: She is oriented to person, place, and time.         Assessment/Plan   Problem List Items Addressed This Visit    None  Visit Diagnoses       Recurrent cold sores    -   Primary    Impetigo        Relevant Medications    cephalexin (Keflex) 500 mg capsule    mupirocin (Bactroban) 2 % cream            Cont valtrex daily  Call in one week with update  Fu in 3 months for routine care

## 2023-03-29 ENCOUNTER — PATIENT OUTREACH (OUTPATIENT)
Dept: CARE COORDINATION | Facility: CLINIC | Age: 23
End: 2023-03-29
Payer: COMMERCIAL

## 2023-03-29 ENCOUNTER — DOCUMENTATION (OUTPATIENT)
Dept: PRIMARY CARE | Facility: CLINIC | Age: 23
End: 2023-03-29
Payer: COMMERCIAL

## 2023-03-29 DIAGNOSIS — G25.3 MYOCLONUS: ICD-10-CM

## 2023-03-29 NOTE — PROGRESS NOTES
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  Admission Date:3/27/23  Discharge Date:3/28/23    PCP appt: 4/4/23    Engagement  Call Start Time: 1114 (3/29/2023 11:21 AM)    Medications  Medications reviewed with patient/caregiver?: Yes (3/29/2023 11:21 AM)  Is the patient having any side effects they believe may be caused by any medication additions or changes?: No (3/29/2023 11:21 AM)  Does the patient have all medications ordered at discharge?: Yes (3/29/2023 11:21 AM)  Care Management Interventions: Provided patient education (3/29/2023 11:21 AM)  Prescription Comments: Patient has all her current medications nothing no adjustments were made (3/29/2023 11:21 AM)  Is the patient taking all medications as directed (includes completed medication regime)?: Yes (3/29/2023 11:21 AM)  Care Management Interventions: Provided patient education (3/29/2023 11:21 AM)    Appointments  Does the patient have a primary care provider?: Yes (3/29/2023 11:21 AM)  Care Management Interventions: Verified appointment date/time/provider (3/29/2023 11:21 AM)  Has the patient kept scheduled appointments due by today?: Yes (3/29/2023 11:21 AM)  Care Management Interventions: Advised patient to keep appointment; Educated on importance of keeping appointment (3/29/2023 11:21 AM)    Self Management  Has home health visited the patient within 72 hours of discharge?: Not applicable (3/29/2023 11:21 AM)    Patient Teaching  Does the patient have access to their discharge instructions?: Yes (3/29/2023 11:21 AM)  What is the patient's perception of their health status since discharge?: Improving (3/29/2023 11:21 AM)  Is the patient/caregiver able to teach back the hierarchy of who to call/visit for symptoms/problems? PCP, Specialist, Home Health nurse, Urgent Care, ED, 911: Yes (3/29/2023 11:21 AM)    Yessica Muniz LPN

## 2023-03-31 DIAGNOSIS — R21 RASH: Primary | ICD-10-CM

## 2023-03-31 RX ORDER — MUPIROCIN 20 MG/G
OINTMENT TOPICAL 3 TIMES DAILY
Qty: 22 G | Refills: 0 | Status: SHIPPED | OUTPATIENT
Start: 2023-03-31 | End: 2023-04-10

## 2023-03-31 NOTE — TELEPHONE ENCOUNTER
Pt left vm stating she is having reaction to ATB. Started ATB x3 days ago. Stated her Mom states face is swollen.  Pt observed peeling and itching.  Taking Benadryl w/ATB.  Has not picked up Mupirocin. Stated Pharm has not notified her of Rx.  Please advise.

## 2023-05-01 ENCOUNTER — APPOINTMENT (OUTPATIENT)
Dept: PRIMARY CARE | Facility: CLINIC | Age: 23
End: 2023-05-01
Payer: COMMERCIAL

## 2023-06-16 ENCOUNTER — TELEMEDICINE (OUTPATIENT)
Dept: PRIMARY CARE | Facility: CLINIC | Age: 23
End: 2023-06-16
Payer: COMMERCIAL

## 2023-06-16 ENCOUNTER — TELEPHONE (OUTPATIENT)
Dept: PRIMARY CARE | Facility: CLINIC | Age: 23
End: 2023-06-16

## 2023-06-16 DIAGNOSIS — L30.9 DERMATITIS: Primary | ICD-10-CM

## 2023-06-16 PROCEDURE — 99213 OFFICE O/P EST LOW 20 MIN: CPT | Performed by: NURSE PRACTITIONER

## 2023-06-16 RX ORDER — CETIRIZINE HYDROCHLORIDE 10 MG/1
10 TABLET ORAL DAILY
Qty: 10 TABLET | Refills: 0 | Status: SHIPPED | OUTPATIENT
Start: 2023-06-16 | End: 2023-11-16 | Stop reason: SDUPTHER

## 2023-06-16 RX ORDER — CLONAZEPAM 2 MG/1
2 TABLET ORAL NIGHTLY PRN
COMMUNITY

## 2023-06-16 ASSESSMENT — ENCOUNTER SYMPTOMS
ANOREXIA: 0
FATIGUE: 0
COUGH: 0
EYE PAIN: 1
VOMITING: 0
RESPIRATORY NEGATIVE: 1
SHORTNESS OF BREATH: 0
NERVOUS/ANXIOUS: 1
DIARRHEA: 0
FEVER: 0
NAIL CHANGES: 0
RHINORRHEA: 0
SORE THROAT: 0
CONSTITUTIONAL NEGATIVE: 1

## 2023-06-16 NOTE — PROGRESS NOTES
Subjective   Patient ID: Ayesha Jean is a 23 y.o. female who presents for Rash    Patient says that for the past three days, she has had some flaking and dry skin at the base of her nose and underneath her piercings. Patient says that she had some dry skin above her left eyebrow. She scratched the skin and it flaked off into her eye and caused irritation.     Pt notes that she just took clonidine for her anxiety and did not want to drive for an in-person visit. Patient says that she is working closely with her psychiatrist and does not have any suicidal or homicidal ideations.     Review of Systems   Constitutional: Negative.    HENT: Negative.     Respiratory: Negative.     Skin:  Positive for rash.   Psychiatric/Behavioral:  The patient is nervous/anxious.        Objective   There were no vitals taken for this visit.    No physical exam performed  Patient appears alert, oriented. No rash is noted on the face. There is some dry skin under her piercings in her face. No rash above the eyes bilaterally    Assessment/Plan   Problem List Items Addressed This Visit    None  Visit Diagnoses       Dermatitis    -  Primary    Relevant Medications    cetirizine (ZyrTEC) 10 mg tablet        Advised patient that she is to use Aquaphore to moisturize the areas of her face with dry skin. Patient to wash face with cool water. Advised use of zyrtec to help with any itching. Advised ER for any worsening eyelid redness, itching or new/concerning symptoms; she agreed. Pt to follow up in person in 2-3 days if no better; she agreed.

## 2023-06-20 ENCOUNTER — OFFICE VISIT (OUTPATIENT)
Dept: PRIMARY CARE | Facility: CLINIC | Age: 23
End: 2023-06-20
Payer: COMMERCIAL

## 2023-06-20 VITALS
HEIGHT: 69 IN | BODY MASS INDEX: 22.81 KG/M2 | SYSTOLIC BLOOD PRESSURE: 118 MMHG | WEIGHT: 154 LBS | DIASTOLIC BLOOD PRESSURE: 70 MMHG | OXYGEN SATURATION: 100 % | HEART RATE: 88 BPM | RESPIRATION RATE: 16 BRPM | TEMPERATURE: 98.6 F

## 2023-06-20 DIAGNOSIS — R63.4 WEIGHT LOSS: ICD-10-CM

## 2023-06-20 DIAGNOSIS — F12.20 CANNABIS DEPENDENCE, UNCOMPLICATED (MULTI): ICD-10-CM

## 2023-06-20 DIAGNOSIS — K59.09 CHRONIC CONSTIPATION: Primary | ICD-10-CM

## 2023-06-20 DIAGNOSIS — B00.1 COLD SORE: ICD-10-CM

## 2023-06-20 DIAGNOSIS — D35.2 PROLACTINOMA (MULTI): ICD-10-CM

## 2023-06-20 DIAGNOSIS — M54.50 ACUTE BILATERAL LOW BACK PAIN WITHOUT SCIATICA: ICD-10-CM

## 2023-06-20 DIAGNOSIS — B00.1 HERPES SIMPLEX LABIALIS: ICD-10-CM

## 2023-06-20 DIAGNOSIS — E22.1 HYPERPROLACTINEMIA (MULTI): ICD-10-CM

## 2023-06-20 DIAGNOSIS — E72.59: ICD-10-CM

## 2023-06-20 DIAGNOSIS — F25.0 SCHIZOAFFECTIVE DISORDER, BIPOLAR TYPE (MULTI): ICD-10-CM

## 2023-06-20 DIAGNOSIS — R21 RASH: ICD-10-CM

## 2023-06-20 PROBLEM — J02.9 SORE THROAT: Status: RESOLVED | Noted: 2023-03-19 | Resolved: 2023-06-20

## 2023-06-20 PROBLEM — R44.3 HALLUCINATIONS, UNSPECIFIED: Status: RESOLVED | Noted: 2018-01-07 | Resolved: 2023-06-20

## 2023-06-20 PROBLEM — M25.562 CHRONIC PAIN OF BOTH KNEES: Status: RESOLVED | Noted: 2023-03-19 | Resolved: 2023-06-20

## 2023-06-20 PROBLEM — M25.561 CHRONIC PAIN OF BOTH KNEES: Status: RESOLVED | Noted: 2023-03-19 | Resolved: 2023-06-20

## 2023-06-20 PROBLEM — T50.905A MEDICATION REACTION: Status: RESOLVED | Noted: 2023-03-19 | Resolved: 2023-06-20

## 2023-06-20 PROBLEM — S99.919A ANKLE INJURY: Status: RESOLVED | Noted: 2023-03-19 | Resolved: 2023-06-20

## 2023-06-20 PROBLEM — Z04.9 CONDITION NOT FOUND: Status: RESOLVED | Noted: 2023-03-19 | Resolved: 2023-06-20

## 2023-06-20 PROBLEM — S13.9XXA CERVICAL SPRAIN: Status: RESOLVED | Noted: 2023-03-19 | Resolved: 2023-06-20

## 2023-06-20 PROBLEM — M25.362 PATELLAR INSTABILITY OF LEFT KNEE: Status: RESOLVED | Noted: 2023-03-19 | Resolved: 2023-06-20

## 2023-06-20 PROBLEM — T50.905A WEIGHT GAIN DUE TO MEDICATION: Status: RESOLVED | Noted: 2020-01-30 | Resolved: 2023-06-20

## 2023-06-20 PROBLEM — M25.512 BILATERAL SHOULDER PAIN: Status: RESOLVED | Noted: 2023-03-19 | Resolved: 2023-06-20

## 2023-06-20 PROBLEM — M75.50 SCAPULOTHORACIC BURSITIS: Status: RESOLVED | Noted: 2023-03-19 | Resolved: 2023-06-20

## 2023-06-20 PROBLEM — R39.9 UTI SYMPTOMS: Status: RESOLVED | Noted: 2023-03-19 | Resolved: 2023-06-20

## 2023-06-20 PROBLEM — M79.10 MYALGIA: Status: RESOLVED | Noted: 2023-03-19 | Resolved: 2023-06-20

## 2023-06-20 PROBLEM — F99 PSYCHIATRIC DISORDER: Status: RESOLVED | Noted: 2020-10-22 | Resolved: 2023-06-20

## 2023-06-20 PROBLEM — G44.221 CHRONIC TENSION-TYPE HEADACHE, INTRACTABLE: Status: RESOLVED | Noted: 2020-07-17 | Resolved: 2023-06-20
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
## 2023-06-20 PROBLEM — R09.81 NASAL CONGESTION: Status: RESOLVED | Noted: 2023-03-19 | Resolved: 2023-06-20

## 2023-06-20 PROBLEM — E87.6 HYPOKALEMIA: Status: RESOLVED | Noted: 2020-03-10 | Resolved: 2023-06-20

## 2023-06-20 PROBLEM — R45.851 SUICIDAL IDEATIONS: Status: RESOLVED | Noted: 2018-01-07 | Resolved: 2023-06-20

## 2023-06-20 PROBLEM — M54.6 THORACIC BACK PAIN: Status: RESOLVED | Noted: 2023-03-19 | Resolved: 2023-06-20

## 2023-06-20 PROBLEM — L25.9 CONTACT DERMATITIS: Status: RESOLVED | Noted: 2023-03-19 | Resolved: 2023-06-20

## 2023-06-20 PROBLEM — S93.409A SPRAIN OF ANKLE: Status: RESOLVED | Noted: 2020-10-22 | Resolved: 2023-06-20

## 2023-06-20 PROBLEM — R35.0 INCREASED URINARY FREQUENCY: Status: RESOLVED | Noted: 2023-03-19 | Resolved: 2023-06-20

## 2023-06-20 PROBLEM — R53.1 WEAKNESS: Status: RESOLVED | Noted: 2023-03-19 | Resolved: 2023-06-20

## 2023-06-20 PROBLEM — R63.5 WEIGHT GAIN DUE TO MEDICATION: Status: RESOLVED | Noted: 2020-01-30 | Resolved: 2023-06-20

## 2023-06-20 PROBLEM — R53.81 MALAISE AND FATIGUE: Status: RESOLVED | Noted: 2023-03-19 | Resolved: 2023-06-20

## 2023-06-20 PROBLEM — U07.1 COVID-19: Status: RESOLVED | Noted: 2023-03-19 | Resolved: 2023-06-20

## 2023-06-20 PROBLEM — R30.0 DYSURIA: Status: RESOLVED | Noted: 2023-03-19 | Resolved: 2023-06-20

## 2023-06-20 PROBLEM — M25.519 SHOULDER PAIN: Status: RESOLVED | Noted: 2021-03-10 | Resolved: 2023-06-20

## 2023-06-20 PROBLEM — R53.83 MALAISE AND FATIGUE: Status: RESOLVED | Noted: 2023-03-19 | Resolved: 2023-06-20

## 2023-06-20 PROBLEM — M25.361 PATELLAR INSTABILITY OF RIGHT KNEE: Status: RESOLVED | Noted: 2023-03-19 | Resolved: 2023-06-20

## 2023-06-20 PROBLEM — R06.00 DYSPNEA: Status: RESOLVED | Noted: 2023-03-19 | Resolved: 2023-06-20

## 2023-06-20 PROBLEM — M25.511 BILATERAL SHOULDER PAIN: Status: RESOLVED | Noted: 2023-03-19 | Resolved: 2023-06-20

## 2023-06-20 PROBLEM — J06.9 URI (UPPER RESPIRATORY INFECTION): Status: RESOLVED | Noted: 2023-03-19 | Resolved: 2023-06-20

## 2023-06-20 PROBLEM — R82.81 PYURIA: Status: RESOLVED | Noted: 2020-03-14 | Resolved: 2023-06-20

## 2023-06-20 PROBLEM — A64 SEXUALLY TRANSMITTED DISEASE (STD): Status: RESOLVED | Noted: 2023-03-19 | Resolved: 2023-06-20

## 2023-06-20 PROBLEM — M25.50 JOINT PAIN: Status: RESOLVED | Noted: 2023-03-19 | Resolved: 2023-06-20

## 2023-06-20 PROBLEM — R09.82 POST-NASAL DRIP: Status: RESOLVED | Noted: 2023-03-19 | Resolved: 2023-06-20

## 2023-06-20 PROBLEM — N62 LARGE BREASTS: Status: RESOLVED | Noted: 2021-03-10 | Resolved: 2023-06-20

## 2023-06-20 PROBLEM — M54.2 NECK PAIN: Status: RESOLVED | Noted: 2023-03-19 | Resolved: 2023-06-20

## 2023-06-20 PROBLEM — M21.40 FLAT FOOT: Status: RESOLVED | Noted: 2023-03-19 | Resolved: 2023-06-20

## 2023-06-20 PROBLEM — R63.5 ABNORMAL WEIGHT GAIN: Status: RESOLVED | Noted: 2023-03-19 | Resolved: 2023-06-20

## 2023-06-20 PROBLEM — M22.2X9 PFS (PATELLOFEMORAL SYNDROME): Status: RESOLVED | Noted: 2023-03-19 | Resolved: 2023-06-20

## 2023-06-20 PROBLEM — Z86.59 HISTORY OF MENTAL DISORDER: Status: RESOLVED | Noted: 2021-03-10 | Resolved: 2023-06-20

## 2023-06-20 PROBLEM — E83.39 HYPOPHOSPHATEMIA: Status: RESOLVED | Noted: 2020-03-11 | Resolved: 2023-06-20

## 2023-06-20 PROBLEM — S93.409A ANKLE SPRAIN: Status: RESOLVED | Noted: 2023-03-19 | Resolved: 2023-06-20

## 2023-06-20 PROBLEM — N89.8 VAGINAL DISCHARGE: Status: RESOLVED | Noted: 2023-03-19 | Resolved: 2023-06-20

## 2023-06-20 PROBLEM — G89.29 CHRONIC PAIN OF BOTH KNEES: Status: RESOLVED | Noted: 2023-03-19 | Resolved: 2023-06-20

## 2023-06-20 PROBLEM — M25.369 KNEE INSTABILITY: Status: RESOLVED | Noted: 2023-03-19 | Resolved: 2023-06-20

## 2023-06-20 PROCEDURE — 99214 OFFICE O/P EST MOD 30 MIN: CPT | Performed by: INTERNAL MEDICINE

## 2023-06-20 PROCEDURE — 1036F TOBACCO NON-USER: CPT | Performed by: INTERNAL MEDICINE

## 2023-06-20 RX ORDER — VALACYCLOVIR HYDROCHLORIDE 500 MG/1
500 TABLET, FILM COATED ORAL DAILY
Qty: 90 TABLET | Refills: 0 | Status: SHIPPED | OUTPATIENT
Start: 2023-06-20 | End: 2023-09-18

## 2023-06-20 ASSESSMENT — PAIN SCALES - GENERAL: PAINLEVEL: 0-NO PAIN

## 2023-06-20 NOTE — PROGRESS NOTES
"Lois Jean is a 23 y.o. female who presents for Constipation and Mouth Lesions.    HPI     Review of Systems   Constitutional:  Negative for fatigue and fever.   Respiratory:  Negative for cough and shortness of breath.    Cardiovascular:  Negative for chest pain and leg swelling.   All other systems reviewed and are negative.      Health Maintenance Due   Topic Date Due    Yearly Adult Physical  Never done    Hepatitis C Screening  Never done    Cervical Cancer Screening  Never done    COVID-19 Vaccine (3 - Booster for Moderna series) 05/16/2023       Objective   /70   Pulse 88   Temp 37 °C (98.6 °F)   Resp 16   Ht 1.753 m (5' 9\")   Wt 69.9 kg (154 lb)   LMP 06/13/2023   SpO2 100%   BMI 22.74 kg/m²     Physical Exam  Vitals and nursing note reviewed.   Constitutional:       Appearance: Normal appearance.   HENT:      Head: Normocephalic.   Eyes:      Conjunctiva/sclera: Conjunctivae normal.      Pupils: Pupils are equal, round, and reactive to light.   Cardiovascular:      Rate and Rhythm: Normal rate and regular rhythm.      Pulses: Normal pulses.      Heart sounds: Normal heart sounds.   Pulmonary:      Effort: Pulmonary effort is normal.      Breath sounds: Normal breath sounds.   Musculoskeletal:         General: No swelling.      Cervical back: Neck supple.   Skin:     General: Skin is warm and dry.   Neurological:      General: No focal deficit present.      Mental Status: She is oriented to person, place, and time.         Assessment/Plan   Problem List Items Addressed This Visit       Chronic constipation - Primary    Prolactinoma (CMS/HCC)    Cannabis dependence, uncomplicated (CMS/HCC)    Hyperprolactinemia (CMS/HCC)    Hyperprolinemia (CMS/HCC)    Schizoaffective disorder, bipolar type (CMS/HCC)     Other Visit Diagnoses       Cold sore        Relevant Medications    valACYclovir (Valtrex) 500 mg tablet    Herpes simplex labialis        Rash        Weight loss        " Relevant Orders    CBC    Thyroid Stimulating Hormone    Comprehensive Metabolic Panel    Acute bilateral low back pain without sciatica            Nsaid for back  Call psych for insomnia  Check labs  Continue valtrex  Antihistamine for rash and maybe hydrocortisone  Cont linzess   With prn laxative not more than once amonth  Stable based on symptoms and exam.  Continue established treatment plan and follow up at least yearly.

## 2023-06-20 NOTE — PROGRESS NOTES
"Subjective   Shelbi Jean is a 23 y.o. female who presents for Constipation and Mouth Lesions.    HPI   Pt previously seen by GI.  Rx'd Linzess.  Not as effective.  Previously recommended gastric emptying study.  Did not complete.  Reports increase in constipation. Occasionally does not have BM x2 weeks.  Taking OTC Ex-lax in addition.  Effective.  LBM: Today.  Stool soft today.  Took Ex lax yesterday.  Denies abdominal pain, visible blood/black stool.    Has rash to corners of mouth.  Spread to brandt-orbital area, onto face, near eye.  Rash was itchy.  Did virtual appt.  Recommended Zyrtec and Hydrocortisone cream.  Zyrtec effective.  Did not try Hydrocortisone.    Pt requesting to continue Valtrex  Stress in life has not decreased.  Continues w/cold sore exacerbations.    Review of Systems    Health Maintenance Due   Topic Date Due    Yearly Adult Physical  Never done    Hepatitis C Screening  Never done    Cervical Cancer Screening  Never done    COVID-19 Vaccine (3 - Booster for Moderna series) 05/16/2023       Objective   /70   Pulse 88   Temp 37 °C (98.6 °F)   Resp 16   Ht 1.753 m (5' 9\")   Wt 69.9 kg (154 lb)   LMP 06/13/2023   SpO2 100%   BMI 22.74 kg/m²     Physical Exam    Assessment/Plan   Problem List Items Addressed This Visit    None  Visit Diagnoses       Cold sore                     "

## 2023-06-21 ASSESSMENT — ENCOUNTER SYMPTOMS
SHORTNESS OF BREATH: 0
FEVER: 0
FATIGUE: 0
COUGH: 0

## 2023-06-22 ENCOUNTER — TELEPHONE (OUTPATIENT)
Dept: PRIMARY CARE | Facility: CLINIC | Age: 23
End: 2023-06-22
Payer: COMMERCIAL

## 2023-06-23 ENCOUNTER — TELEPHONE (OUTPATIENT)
Dept: PRIMARY CARE | Facility: CLINIC | Age: 23
End: 2023-06-23
Payer: COMMERCIAL

## 2023-06-23 DIAGNOSIS — L74.510 AXILLARY HYPERHIDROSIS: Primary | ICD-10-CM

## 2023-06-23 NOTE — TELEPHONE ENCOUNTER
Pt called questioning if her rash to her face is considered contagious.  Pt made aware it is not considered contagious, is eczema like rash.  Pt questioned if she can exfoliate. I recommended pt do not exfoliate when exacerbated.  Take med and apply Hydrocortisone cream as you had Rx'd until healed.  Voiced understanding.

## 2023-06-27 ENCOUNTER — APPOINTMENT (OUTPATIENT)
Dept: PRIMARY CARE | Facility: CLINIC | Age: 23
End: 2023-06-27
Payer: COMMERCIAL

## 2023-07-05 ENCOUNTER — TELEPHONE (OUTPATIENT)
Dept: PRIMARY CARE | Facility: CLINIC | Age: 23
End: 2023-07-05
Payer: COMMERCIAL

## 2023-07-05 NOTE — TELEPHONE ENCOUNTER
Pt made aware.  Asked that message be relayed to her mother.  Pt put her mother on speakerphone and was made aware of recommendation.

## 2023-07-05 NOTE — TELEPHONE ENCOUNTER
"Pt called stating she went out of town over the weekend.  Forgot to get clonidine refilled before going out of town. Fri: N/V.  Saturday N/V worsened.  Sun: no sleep, felt numbness/tingling when bending BLE.  Mild improvement w/extension.  Had pain to AC area when BUE flexed.  L>R.  Noted more pain, like \"hot nail\" in UE.  Exteneded up into upper arm.  Before traveling home, felt wobbly, heart racing, jittery.  Took P: 85.  SPB WNL (could not remember number).  DBP \"off\" (could not remember number).  Before heading home pulse improved to 75.  Took clonidine when she got home.  N/V improved.  C/o feeling some continued weakness/wobbliness, chest pressure/back pressure, SOB.  I recommended ED eval. Stated she does not want to go to ED d/t past events in ED w/changes in medications and has to stay on strict med regimen.  Pt has labs that she has not completed since your last visit w/her.  Do you want her to do labs and follow up w/you.  ED if sx's worsen?  She is agreeable to ED eval if sx's worsen.  Please advise.  "

## 2023-08-09 ENCOUNTER — OFFICE VISIT (OUTPATIENT)
Dept: PRIMARY CARE | Facility: CLINIC | Age: 23
End: 2023-08-09
Payer: COMMERCIAL

## 2023-08-09 VITALS
HEIGHT: 69 IN | OXYGEN SATURATION: 100 % | SYSTOLIC BLOOD PRESSURE: 102 MMHG | HEART RATE: 100 BPM | RESPIRATION RATE: 16 BRPM | TEMPERATURE: 98.6 F | WEIGHT: 145 LBS | DIASTOLIC BLOOD PRESSURE: 78 MMHG | BODY MASS INDEX: 21.48 KG/M2

## 2023-08-09 DIAGNOSIS — M89.8X9 BONE PAIN: Primary | ICD-10-CM

## 2023-08-09 DIAGNOSIS — M54.2 NECK PAIN: ICD-10-CM

## 2023-08-09 DIAGNOSIS — M54.50 ACUTE BILATERAL LOW BACK PAIN WITHOUT SCIATICA: ICD-10-CM

## 2023-08-09 DIAGNOSIS — K03.2: ICD-10-CM

## 2023-08-09 DIAGNOSIS — R25.1 TREMOR: ICD-10-CM

## 2023-08-09 DIAGNOSIS — R63.4 WEIGHT LOSS: ICD-10-CM

## 2023-08-09 PROCEDURE — 1036F TOBACCO NON-USER: CPT | Performed by: INTERNAL MEDICINE

## 2023-08-09 PROCEDURE — 99214 OFFICE O/P EST MOD 30 MIN: CPT | Performed by: INTERNAL MEDICINE

## 2023-08-09 RX ORDER — SUVOREXANT 10 MG/1
10 TABLET, FILM COATED ORAL NIGHTLY
COMMUNITY
Start: 2023-07-11

## 2023-08-09 ASSESSMENT — PAIN SCALES - GENERAL: PAINLEVEL: 4

## 2023-08-09 NOTE — PROGRESS NOTES
"Lois Jean is a 23 y.o. female who presents for Pain.    HPI   C/o \"bone pain\" x4 weeks.  Started back of neck, now generalized pain.  Reports tremor like movements causing difficulty w/ambulation and picking up objects.  Went to [facility] ED a few months ago d/t tremor like movements.  R/t Anxiety.  Pain is causing sleep disruption.  C/o fatigue.  Teeth breaking off in little pieces.  Saw Dentist, did not mention breaking teeth.  Tx: Aleve and heat.  Moderately effective.  Started gym regimen a few months ago.  Cannot stay on regimen d/t pain.    Review of Systems   Constitutional:  Negative for fatigue and fever.   Respiratory:  Negative for cough and shortness of breath.    Cardiovascular:  Negative for chest pain and leg swelling.   All other systems reviewed and are negative.      Health Maintenance Due   Topic Date Due    Yearly Adult Physical  Never done    Hepatitis C Screening  Never done    Cervical Cancer Screening  Never done    COVID-19 Vaccine (3 - Booster for Moderna series) 05/16/2023       Objective   /78   Pulse 100   Temp 37 °C (98.6 °F)   Resp 16   Ht 1.753 m (5' 9\")   Wt 65.8 kg (145 lb)   SpO2 100%   BMI 21.41 kg/m²     Physical Exam  Vitals and nursing note reviewed.   Constitutional:       Appearance: Normal appearance.   HENT:      Head: Normocephalic.   Eyes:      Conjunctiva/sclera: Conjunctivae normal.      Pupils: Pupils are equal, round, and reactive to light.   Cardiovascular:      Rate and Rhythm: Normal rate and regular rhythm.      Pulses: Normal pulses.      Heart sounds: Normal heart sounds.   Pulmonary:      Effort: Pulmonary effort is normal.      Breath sounds: Normal breath sounds.   Musculoskeletal:         General: No swelling.      Cervical back: Neck supple.   Skin:     General: Skin is warm and dry.   Neurological:      General: No focal deficit present.      Mental Status: She is oriented to person, place, and time.         Assessment/Plan "   Problem List Items Addressed This Visit    None  Visit Diagnoses       Bone pain    -  Primary    Relevant Orders    Vitamin D, Total    Vitamin B12    Thyroid Stimulating Hormone    Sedimentation Rate    Magnesium    Comprehensive Metabolic Panel    CBC    C-reactive protein    EVA    Citrulline Antibody, IgG    PTH, intact    Rheumatoid factor    Teeth erosion due to medicine        Tremor        Acute bilateral low back pain without sciatica        Relevant Orders    XR lumbar spine 2-3 views    Neck pain        Relevant Orders    XR cervical spine 2-3 views    XR thoracic spine 2 views    Weight loss        Relevant Orders    Lipid Panel          Will cont to monitor symtpoms  Unclear etiology  Check labs  Will determine plan based on results

## 2023-08-10 ENCOUNTER — LAB (OUTPATIENT)
Dept: LAB | Facility: LAB | Age: 23
End: 2023-08-10
Payer: COMMERCIAL

## 2023-08-10 DIAGNOSIS — R63.4 WEIGHT LOSS: ICD-10-CM

## 2023-08-10 DIAGNOSIS — M89.8X9 BONE PAIN: ICD-10-CM

## 2023-08-10 LAB
ALANINE AMINOTRANSFERASE (SGPT) (U/L) IN SER/PLAS: 6 U/L (ref 7–45)
ALBUMIN (G/DL) IN SER/PLAS: 4.7 G/DL (ref 3.4–5)
ALKALINE PHOSPHATASE (U/L) IN SER/PLAS: 31 U/L (ref 33–110)
ANION GAP IN SER/PLAS: 14 MMOL/L (ref 10–20)
ASPARTATE AMINOTRANSFERASE (SGOT) (U/L) IN SER/PLAS: 11 U/L (ref 9–39)
BILIRUBIN TOTAL (MG/DL) IN SER/PLAS: 0.4 MG/DL (ref 0–1.2)
C REACTIVE PROTEIN (MG/L) IN SER/PLAS: <0.1 MG/DL
CALCIDIOL (25 OH VITAMIN D3) (NG/ML) IN SER/PLAS: 43 NG/ML
CALCIUM (MG/DL) IN SER/PLAS: 10.1 MG/DL (ref 8.6–10.3)
CARBON DIOXIDE, TOTAL (MMOL/L) IN SER/PLAS: 26 MMOL/L (ref 21–32)
CHLORIDE (MMOL/L) IN SER/PLAS: 107 MMOL/L (ref 98–107)
CHOLESTEROL (MG/DL) IN SER/PLAS: 143 MG/DL (ref 0–199)
CHOLESTEROL IN HDL (MG/DL) IN SER/PLAS: 51.3 MG/DL
CHOLESTEROL/HDL RATIO: 2.8
CITRULLINE ANTIBODY, IGG: <1 U/ML
COBALAMIN (VITAMIN B12) (PG/ML) IN SER/PLAS: 380 PG/ML (ref 211–911)
CREATININE (MG/DL) IN SER/PLAS: 0.77 MG/DL (ref 0.5–1.05)
ERYTHROCYTE DISTRIBUTION WIDTH (RATIO) BY AUTOMATED COUNT: 14.2 % (ref 11.5–14.5)
ERYTHROCYTE MEAN CORPUSCULAR HEMOGLOBIN CONCENTRATION (G/DL) BY AUTOMATED: 32 G/DL (ref 32–36)
ERYTHROCYTE MEAN CORPUSCULAR VOLUME (FL) BY AUTOMATED COUNT: 90 FL (ref 80–100)
ERYTHROCYTES (10*6/UL) IN BLOOD BY AUTOMATED COUNT: 4.35 X10E12/L (ref 4–5.2)
GFR FEMALE: >90 ML/MIN/1.73M2
GLUCOSE (MG/DL) IN SER/PLAS: 88 MG/DL (ref 74–99)
HEMATOCRIT (%) IN BLOOD BY AUTOMATED COUNT: 39.1 % (ref 36–46)
HEMOGLOBIN (G/DL) IN BLOOD: 12.5 G/DL (ref 12–16)
LDL: 78 MG/DL (ref 0–119)
LEUKOCYTES (10*3/UL) IN BLOOD BY AUTOMATED COUNT: 4.6 X10E9/L (ref 4.4–11.3)
MAGNESIUM (MG/DL) IN SER/PLAS: 1.76 MG/DL (ref 1.6–2.4)
PARATHYRIN INTACT (PG/ML) IN SER/PLAS: 13.5 PG/ML (ref 18.5–88)
PLATELETS (10*3/UL) IN BLOOD AUTOMATED COUNT: 316 X10E9/L (ref 150–450)
POTASSIUM (MMOL/L) IN SER/PLAS: 4.1 MMOL/L (ref 3.5–5.3)
PROTEIN TOTAL: 7.6 G/DL (ref 6.4–8.2)
RHEUMATOID FACTOR (IU/ML) IN SERUM OR PLASMA: <10 IU/ML (ref 0–15)
SEDIMENTATION RATE, ERYTHROCYTE: 1 MM/H (ref 0–20)
SODIUM (MMOL/L) IN SER/PLAS: 143 MMOL/L (ref 136–145)
THYROTROPIN (MIU/L) IN SER/PLAS BY DETECTION LIMIT <= 0.05 MIU/L: 1.56 MIU/L (ref 0.44–3.98)
TRIGLYCERIDE (MG/DL) IN SER/PLAS: 71 MG/DL (ref 0–149)
UREA NITROGEN (MG/DL) IN SER/PLAS: 19 MG/DL (ref 6–23)
VLDL: 14 MG/DL (ref 0–40)

## 2023-08-10 PROCEDURE — 82607 VITAMIN B-12: CPT

## 2023-08-10 PROCEDURE — 86038 ANTINUCLEAR ANTIBODIES: CPT

## 2023-08-10 PROCEDURE — 83735 ASSAY OF MAGNESIUM: CPT

## 2023-08-10 PROCEDURE — 86200 CCP ANTIBODY: CPT

## 2023-08-10 PROCEDURE — 86140 C-REACTIVE PROTEIN: CPT

## 2023-08-10 PROCEDURE — 36415 COLL VENOUS BLD VENIPUNCTURE: CPT

## 2023-08-10 PROCEDURE — 85652 RBC SED RATE AUTOMATED: CPT

## 2023-08-10 PROCEDURE — 80053 COMPREHEN METABOLIC PANEL: CPT

## 2023-08-10 PROCEDURE — 80061 LIPID PANEL: CPT

## 2023-08-10 PROCEDURE — 84443 ASSAY THYROID STIM HORMONE: CPT

## 2023-08-10 PROCEDURE — 85027 COMPLETE CBC AUTOMATED: CPT

## 2023-08-10 PROCEDURE — 83970 ASSAY OF PARATHORMONE: CPT

## 2023-08-10 PROCEDURE — 86431 RHEUMATOID FACTOR QUANT: CPT

## 2023-08-10 PROCEDURE — 82306 VITAMIN D 25 HYDROXY: CPT

## 2023-08-10 ASSESSMENT — ENCOUNTER SYMPTOMS
FATIGUE: 0
COUGH: 0
FEVER: 0
SHORTNESS OF BREATH: 0

## 2023-08-11 LAB — ANTI-NUCLEAR ANTIBODY (ANA): NEGATIVE

## 2023-08-14 ENCOUNTER — TELEPHONE (OUTPATIENT)
Dept: PRIMARY CARE | Facility: CLINIC | Age: 23
End: 2023-08-14
Payer: COMMERCIAL

## 2023-08-14 DIAGNOSIS — E20.0 IDIOPATHIC HYPOPARATHYROIDISM (MULTI): ICD-10-CM

## 2023-08-14 DIAGNOSIS — M89.8X9 BONE PAIN: ICD-10-CM

## 2023-08-14 DIAGNOSIS — M25.50 POLYARTHRALGIA: Primary | ICD-10-CM

## 2023-08-14 NOTE — RESULT ENCOUNTER NOTE
Call patient I looked at her xrays and labs  They do all look good which is reassuring    The only thing I saw was that her parathyroid hormone was a little low  In light of normal calcium and vitamin d I am not sure that it would cause any symptoms but my   Recommendation is to consult with rheumatology, because this may be fibromyalgia  But also endocrine make recommendation on follow up on the parathyroid or if they feel it can contribute    She sees endo, I will put in rheum and endo and well set up rheum and she can set up with her endo

## 2023-08-14 NOTE — TELEPHONE ENCOUNTER
----- Message from Jacquelin Moy DO sent at 8/14/2023 10:16 AM EDT -----  Call patient I looked at her xrays and labs  They do all look good which is reassuring    The only thing I saw was that her parathyroid hormone was a little low  In light of normal calcium and vitamin d I am not sure that it would cause any symptoms but my   Recommendation is to consult with rheumatology, because this may be fibromyalgia  But also endocrine make recommendation on follow up on the parathyroid or if they feel it can contribute    She sees endo, I will put in rheum and endo and well set up rheum and she can set up with her endo

## 2023-08-30 ENCOUNTER — TELEMEDICINE (OUTPATIENT)
Dept: PRIMARY CARE | Facility: CLINIC | Age: 23
End: 2023-08-30
Payer: COMMERCIAL

## 2023-08-30 DIAGNOSIS — Z20.822 SUSPECTED COVID-19 VIRUS INFECTION: Primary | ICD-10-CM

## 2023-08-30 PROCEDURE — 87635 SARS-COV-2 COVID-19 AMP PRB: CPT

## 2023-08-30 PROCEDURE — 99214 OFFICE O/P EST MOD 30 MIN: CPT | Performed by: NURSE PRACTITIONER

## 2023-08-30 RX ORDER — ALBUTEROL SULFATE 90 UG/1
2 AEROSOL, METERED RESPIRATORY (INHALATION) EVERY 4 HOURS PRN
Qty: 8.5 G | Refills: 0 | Status: SHIPPED | OUTPATIENT
Start: 2023-08-30 | End: 2024-01-31 | Stop reason: ALTCHOICE

## 2023-08-30 ASSESSMENT — ENCOUNTER SYMPTOMS
FATIGUE: 1
WHEEZING: 0
CHILLS: 0
VOMITING: 0
APPETITE CHANGE: 1
FEVER: 0
DIARRHEA: 0
COUGH: 1
HEADACHES: 1
CHEST TIGHTNESS: 1
ABDOMINAL PAIN: 0
SORE THROAT: 1
SHORTNESS OF BREATH: 1
NAUSEA: 1
MYALGIAS: 0
RHINORRHEA: 1

## 2023-08-30 NOTE — PROGRESS NOTES
Subjective   Patient ID: Shelbi Jean is a 23 y.o. female who presents for Cough. Exposed to covid.     Patient says that her and her fiance went to a trip to [place] on Saturday. Pt's fiance tested positive for COVID on Sunday. Patient's symptoms started late Monday. Patient says that she has chest congestion, post nasal drainage, cough and sinus pressure. Pt has a productive cough.     Review of Systems   Constitutional:  Positive for appetite change and fatigue. Negative for chills and fever.   HENT:  Positive for postnasal drip, rhinorrhea and sore throat. Negative for congestion.    Respiratory:  Positive for cough, chest tightness and shortness of breath. Negative for wheezing.    Cardiovascular:  Negative for chest pain.   Gastrointestinal:  Positive for nausea. Negative for abdominal pain, diarrhea and vomiting.   Musculoskeletal:  Negative for myalgias.   Neurological:  Positive for headaches.     Objective   There were no vitals taken for this visit.    Physical Exam  Constitutional:       General: She is not in acute distress.     Appearance: She is ill-appearing. She is not toxic-appearing.   Neurological:      Mental Status: She is alert.     Not done as this was a virtual visit    Assessment/Plan   Problem List Items Addressed This Visit    None  Visit Diagnoses       Suspected COVID-19 virus infection    -  Primary    Relevant Medications    albuterol (ProAir HFA) 90 mcg/actuation inhaler    Other Relevant Orders    Sars-CoV-2 PCR, Symptomatic        This visit was completed via Epic due to the restrictions of the COVID-19 pandemic. All issues as below were discussed and addressed but no physical exam was performed. If it was felt that the patient should be evaluated in the clinic then they were directed there. The patient verbally consented to the visit.     Approximately 30 minutes spent performing virtual video visit.     Patient to have COVID testing done today. She declines the need to go to the  ER. Will start pt on inhaler that she is to use every four to six hours. Pt declined the need for a steroid taper or flonase at this time. Advised patient on use of humidifier and hot steam treatments. Discussed that patient is to drink plenty of fluids and stay well hydrated. Can take tylenol as needed for any fevers or discomfort. Patient can use flonase as well. Discussed that patient is to go to the ER for any chest pain, difficulty breathing, shortness of breath or new/concerning symptoms; she agreed. Will call pt when results become available. Pt reminded to self quarantine; she agreed.

## 2023-08-31 ENCOUNTER — TELEPHONE (OUTPATIENT)
Dept: PRIMARY CARE | Facility: CLINIC | Age: 23
End: 2023-08-31
Payer: COMMERCIAL

## 2023-08-31 DIAGNOSIS — U07.1 COVID-19: Primary | ICD-10-CM

## 2023-08-31 LAB — SARS-COV-2 RESULT: DETECTED

## 2023-08-31 NOTE — TELEPHONE ENCOUNTER
"Pt would like to start antiviral. Due to her extensive medication list, will start pt on lagevrio. Patient says that she had an episode of \"gasping for air\" in her sleep. I advised that patient should be evaluated in ER for this but she does not feel like it is necessary at this time.     Discussed treatment options available for COVID infection, reviewed risks and benefits of the medication prescribed, and all questions were answered. Patient advised that she is to stop the medication right away and call 911 if she experiences any negative side effects from the medication.     Advised patient on use of humidifier and hot steam treatments. Discussed that patient is to drink plenty of fluids and stay well hydrated. Can take tylenol as needed for any fevers or discomfort. Patient can use mucinex and flonase as well. Discussed that patient is to go to the ER for any chest pain, difficulty breathing, shortness of breath or new/concerning symptoms; she agreed. Pt reminded to self quarantine for at least five days since symptoms started and until she is feeling better. she was advised to wear a mask for at least 10 days after quarantine when in public; she agreed.  "

## 2023-09-15 DIAGNOSIS — B00.1 COLD SORE: ICD-10-CM

## 2023-09-18 RX ORDER — VALACYCLOVIR HYDROCHLORIDE 500 MG/1
500 TABLET, FILM COATED ORAL DAILY
Qty: 90 TABLET | Refills: 0 | Status: SHIPPED | OUTPATIENT
Start: 2023-09-18 | End: 2024-01-16 | Stop reason: SDUPTHER

## 2023-10-02 ENCOUNTER — TELEPHONE (OUTPATIENT)
Dept: PRIMARY CARE | Facility: CLINIC | Age: 23
End: 2023-10-02
Payer: COMMERCIAL

## 2023-10-02 NOTE — TELEPHONE ENCOUNTER
"Spoke w/pt.  Woke up this AM.  Got out of bed this AM.  Started \"convulsing\".  Landed on her bottom.  Was able to  yell for help.  Dad arrived and picked pt up.  Seizure like activity had stopped.  Reports recent med change, per Endo.  Started Flexeril for Fibromyalgia and to help w/sleep.  Not sleeping well w/use, dry mouth.  Feeling worse.  Is trying to hydrate.  Made pt aware, she should be evaluated at ED d/t seizure like activity.  Voiced hesitancy.  Encouragement given.  Stated she will go for eval.  Encouraged pt to notify Rheum of sx's/ineffectiveness of Flexeril as well.  Voiced understanding.  "

## 2023-10-06 ENCOUNTER — LAB (OUTPATIENT)
Dept: LAB | Facility: LAB | Age: 23
End: 2023-10-06
Payer: COMMERCIAL

## 2023-10-06 DIAGNOSIS — M79.7 FIBROMYALGIA: ICD-10-CM

## 2023-10-06 DIAGNOSIS — E83.39 OTHER DISORDERS OF PHOSPHORUS METABOLISM: Primary | ICD-10-CM

## 2023-10-06 LAB
ALBUMIN SERPL BCP-MCNC: 4.8 G/DL (ref 3.4–5)
ALP SERPL-CCNC: 32 U/L (ref 33–110)
ALT SERPL W P-5'-P-CCNC: 6 U/L (ref 7–45)
ANION GAP SERPL CALC-SCNC: 14 MMOL/L (ref 10–20)
AST SERPL W P-5'-P-CCNC: 12 U/L (ref 9–39)
BILIRUB SERPL-MCNC: 0.4 MG/DL (ref 0–1.2)
BUN SERPL-MCNC: 13 MG/DL (ref 6–23)
CALCIUM SERPL-MCNC: 9.7 MG/DL (ref 8.6–10.3)
CHLORIDE SERPL-SCNC: 109 MMOL/L (ref 98–107)
CO2 SERPL-SCNC: 24 MMOL/L (ref 21–32)
CREAT SERPL-MCNC: 0.67 MG/DL (ref 0.5–1.05)
GFR SERPL CREATININE-BSD FRML MDRD: >90 ML/MIN/1.73M*2
GLUCOSE SERPL-MCNC: 82 MG/DL (ref 74–99)
POTASSIUM SERPL-SCNC: 4.1 MMOL/L (ref 3.5–5.3)
PROT SERPL-MCNC: 7.4 G/DL (ref 6.4–8.2)
SODIUM SERPL-SCNC: 143 MMOL/L (ref 136–145)

## 2023-10-06 PROCEDURE — 36415 COLL VENOUS BLD VENIPUNCTURE: CPT

## 2023-10-06 PROCEDURE — 84207 ASSAY OF VITAMIN B-6: CPT

## 2023-10-06 PROCEDURE — 80053 COMPREHEN METABOLIC PANEL: CPT

## 2023-10-09 ENCOUNTER — OFFICE VISIT (OUTPATIENT)
Dept: RHEUMATOLOGY | Facility: CLINIC | Age: 23
End: 2023-10-09
Payer: COMMERCIAL

## 2023-10-09 VITALS
HEART RATE: 118 BPM | DIASTOLIC BLOOD PRESSURE: 72 MMHG | SYSTOLIC BLOOD PRESSURE: 101 MMHG | HEIGHT: 69 IN | BODY MASS INDEX: 20.59 KG/M2 | TEMPERATURE: 98 F | WEIGHT: 139 LBS

## 2023-10-09 DIAGNOSIS — M79.7 FIBROMYALGIA: Primary | ICD-10-CM

## 2023-10-09 DIAGNOSIS — E83.39 ADULT HYPOPHOSPHATASIA: ICD-10-CM

## 2023-10-09 PROCEDURE — 1036F TOBACCO NON-USER: CPT | Performed by: STUDENT IN AN ORGANIZED HEALTH CARE EDUCATION/TRAINING PROGRAM

## 2023-10-09 PROCEDURE — 99214 OFFICE O/P EST MOD 30 MIN: CPT | Performed by: STUDENT IN AN ORGANIZED HEALTH CARE EDUCATION/TRAINING PROGRAM

## 2023-10-09 NOTE — PROGRESS NOTES
Rheumatology Follow Up Outpatient  Note    Subjective   Shelbi Jean is a 23 y.o. female presenting today for Fibromyalgia.    History of Presenting Problem:   Rheum history:    She has recurrent episodes of pain all over her body for many years. Pain is in her shoulders, neck, hips, lower back, hips, knees and arms. She stays in bed with these episodes. She has chronic fatigue. She has chronic sleep issues. She has difficulty falling asleep. She doesn’t snore.     She has poor teeth that can easily break. She also had a low bone density?? at sometime  She is adopted and no known family history    Reviewed C/T/LS spine xrays 8/2023 unremarkable  Labs 8/2023: EVA-, RF/CCP-, ESR/CRP normal. Vitamin D, B12 normal. TSH normal.   CBC normal and CMP with ALK 31 (L)    Interval history:   She didn't tolerate Flexeril and self stopped. She developed severe anxiety, insomnia and one fainting episode. She lost her brother a month ago and she has been dealing with a lot of stress.       Past Medical History:   Past Medical History:   Diagnosis Date    ADHD (attention deficit hyperactivity disorder) 2006    Allergic     Anemia     Anxiety     Arthritis     Cervicalgia 04/09/2021    Chronic neck and back pain    Depression     Hypertrophy of breast 04/09/2021    Macromastia    Hypertrophy of breast 04/09/2021    Large breasts    Personal history of other diseases of the musculoskeletal system and connective tissue 09/28/2020    History of neck pain    Personal history of other diseases of the respiratory system 10/10/2022    History of upper respiratory infection    Personal history of other mental and behavioral disorders     History of mental disorder    Substance abuse (CMS/[facility])     Visual impairment        Allergies:   Allergies   Allergen Reactions    Fluoxetine Other     Other reaction(s): suicidal    causes depression per mom    Lurasidone Other     Other reaction(s): over dose/suicidal   suicidal    Sertraline Other      Other reaction(s): suicidal    causes depression per mom    Abreva [Docosanol] Unknown    Betamethasone Other    Bupropion Hcl Unknown    Codeine Unknown    Lamotrigine Unknown     Other reaction(s): Mental Status Change   Suicidal    Nortriptyline Unknown    Paroxetine Hcl Unknown    Quetiapine Hives and Unknown    Zonisamide Unknown       Medications:   Current Outpatient Medications:     albuterol (ProAir HFA) 90 mcg/actuation inhaler, Inhale 2 puffs every 4 hours if needed for wheezing or shortness of breath., Disp: 8.5 g, Rfl: 0    aluminum chloride (Drysol) 20 % external solution, Apply topically once daily at bedtime., Disp: 60 mL, Rfl: 11    asenapine (Saphris) SL tablet, Dissolve 1 tablet under the tongue every morning. And dissolve 2 tablets at bedtime, Disp: , Rfl:     Belsomra 10 mg tablet, Take 1 tablet (10 mg) by mouth once daily at bedtime., Disp: , Rfl:     cabergoline (Dostinex) 0.5 mg tablet, Take 1 tablet (0.5 mg) by mouth 5 times a week., Disp: , Rfl:     cetirizine (ZyrTEC) 10 mg tablet, Take 1 tablet (10 mg) by mouth once daily for 10 days., Disp: 10 tablet, Rfl: 0    clonazePAM (KlonoPIN) 2 mg tablet, Take 1 tablet (2 mg) by mouth as needed at bedtime for anxiety., Disp: , Rfl:     cloNIDine (Catapres) 0.2 mg tablet, Take 1 tablet (0.2 mg) by mouth once daily at bedtime., Disp: , Rfl:     dexmethylphenidate (Focalin) 10 mg tablet, Take 1 tablet (10 mg) by mouth once daily., Disp: , Rfl:     dexmethylphenidate XR (Focalin XR) 30 mg 24 hr capsule, Take 1 capsule (30 mg) by mouth once daily., Disp: , Rfl:     ergocalciferol (Vitamin D-2) 1.25 MG (99968 UT) capsule, Take 1 capsule (1,250 mcg) by mouth 1 (one) time per week., Disp: , Rfl:     escitalopram (Lexapro) 10 mg tablet, Take 1 tablet by mouth once daily. in addition to to 20mg daily, Disp: , Rfl:     escitalopram (Lexapro) 20 mg tablet, Take 1 tablet by mouth once daily. in addition to 10 mg daily, Disp: , Rfl:     fluticasone (Flonase) 50  mcg/actuation nasal spray, Administer 2 sprays into affected nostril(s) once daily., Disp: , Rfl:     lidocaine (Lidoderm) 5 % patch, Place 1 patch on the skin once daily. Remove & discard patch within 12 hours or as directed by MD., Disp: , Rfl:     Linzess 290 mcg capsule, Take 1 capsule (290 mcg) by mouth once daily., Disp: , Rfl:     metFORMIN XR (Glucophage-XR) 750 mg 24 hr tablet, Take 1 tablet (750 mg) by mouth 2 times a day., Disp: , Rfl:     molnupiravir 200 mg capsule, Take 4 capsules (800 mg) by mouth every 12 hours., Disp: 40 capsule, Rfl: 0    molnupiravir 200 mg capsule, TAKE 4 CAPSULES BY MOUTH EVERY 12 HOURS, Disp: 40 capsule, Rfl: 0    multivit-min/ferrous fumarate (MULTI VITAMIN ORAL), Take by mouth., Disp: , Rfl:     naproxen (Naprosyn) 500 mg tablet, TAKE 1 TABLET BY MOUTH TWICE DAILY AS NEEDED FOR PAIN WITH FOOD, Disp: , Rfl:     polyethylene glycol (Glycolax) 17 gram packet, Take 17 g by mouth 2 times a day as needed., Disp: , Rfl:     spironolactone (Aldactone) 50 mg tablet, Take 1 tablet (50 mg) by mouth 2 times a day., Disp: , Rfl:     valACYclovir (Valtrex) 500 mg tablet, TAKE 1 TABLET BY MOUTH ONCE DAILY, Disp: 90 tablet, Rfl: 0    Review of Systems:   Constitutional: Denies fever, chills   Eyes: Denies dry eyes, pain in the eyes   ENT: Denies dry mouth, loss of taste, sores in the mouth  Cardiovascular: Denies chest pain, palpitations   Respiratory: Denies shortness of breath   Gastrointestinal: Denies heartburn   Integumentary: Denies photosensitivity, rash or lesions, Raynaud's   Neurological: Denies any numbness or tingling    MSK: As per HPI.     All 10 review of systems have been reviewed and are negative for complaint except as noted in the HPI    Objective   Physical Examination:  Vitals:    10/09/23 1449   BP: 101/72   Pulse: (!) 118   Temp: 36.7 °C (98 °F)     Growth %ile SmartLinks can only be used for patients less than 20 years old.  Ht Readings from Last 1 Encounters:  "  10/09/23 1.753 m (5' 9\")     Wt Readings from Last 1 Encounters:   10/09/23 63 kg (139 lb)       General - NAD, sitting up in chair, well-groomed, pleasant, AAOx3  Head: Normocephalic, atraumatic  Eyes - PERRLA, EOMI. No conjunctiva injection.   Lungs - Symmetric chest expansion.Skin - No rashes or ulcers. Skin warm and dry. No erythema on bilateral cheeks.  Extremities - No edema, cyanosis ,or clubbing  Neurological - Alert and oriented x 3,  grossly intact. No focal deficit.      Assessment/Plan   Shelbi Jean is a 23 y.o. female with PMHx of depression, cervical radiculopathy, hypothyroidism, PCOS, PFS, prolactinoma, PTSD who presenting today for follow up on fibromyalgia      ## Fibromyalgia:  -Fibromyalgia WPI=7 SSc=11 (2a=9 2b=2)   -Didn't tolerate Flexeril PRN  -Sleep medicine referral: pending appointment  -Advised patient to improve sleep hygiene, continue to exercise and lose weight  -Reviewed C/T/LS spine xrays 8/2023 unremarkable  -Referred to functional medicine    ##Hypophosphatasia:  -Repeat CMP showed ALK 32  -Pending B6 and will follow up  -Labs 8/2023: EVA-, RF/CCP-, ESR/CRP normal. Vitamin D, B12 normal. TSH normal.   -CBC normal and CMP with ALK 31 (L)      Xenia Proctor MD  Clinical   Department of Rheumatology   [facility]      "

## 2023-10-10 LAB — PYRIDOXAL PHOS SERPL-SCNC: 168.2 NMOL/L (ref 20–125)

## 2023-10-26 ENCOUNTER — OFFICE VISIT (OUTPATIENT)
Dept: PRIMARY CARE | Facility: CLINIC | Age: 23
End: 2023-10-26
Payer: COMMERCIAL

## 2023-10-26 VITALS
WEIGHT: 139 LBS | DIASTOLIC BLOOD PRESSURE: 66 MMHG | HEART RATE: 105 BPM | BODY MASS INDEX: 20.53 KG/M2 | RESPIRATION RATE: 18 BRPM | SYSTOLIC BLOOD PRESSURE: 112 MMHG | TEMPERATURE: 99 F | OXYGEN SATURATION: 97 %

## 2023-10-26 DIAGNOSIS — L30.9 PERIORBITAL DERMATITIS: Primary | ICD-10-CM

## 2023-10-26 PROCEDURE — 99213 OFFICE O/P EST LOW 20 MIN: CPT | Performed by: NURSE PRACTITIONER

## 2023-10-26 PROCEDURE — 1036F TOBACCO NON-USER: CPT | Performed by: NURSE PRACTITIONER

## 2023-10-26 RX ORDER — DEXMETHYLPHENIDATE HYDROCHLORIDE 40 MG/1
40 CAPSULE, EXTENDED RELEASE ORAL DAILY
COMMUNITY
End: 2024-04-24 | Stop reason: ALTCHOICE

## 2023-10-26 ASSESSMENT — ENCOUNTER SYMPTOMS
RESPIRATORY NEGATIVE: 1
CARDIOVASCULAR NEGATIVE: 1
GASTROINTESTINAL NEGATIVE: 1
CONSTITUTIONAL NEGATIVE: 1

## 2023-10-26 NOTE — PROGRESS NOTES
Subjective   Patient ID: Shelbi Jean is a 23 y.o. female who presents for Rash.    Patient has been dealing with dermatitis around her mouth related to using makeup. She says that she saw her PCP for this before and she stopped wearing the makeup she was wearing and it helped. Pt was prescribed betamethasone from her PCP and she says that it caused her to have hives all over. She was taking daily zyrtec and she felt it was not helping so much. Patient says that when she cries or puts water on her face and hair, her skin almost turns white.     Review of Systems   Constitutional: Negative.    HENT: Negative.     Respiratory: Negative.     Cardiovascular: Negative.    Gastrointestinal: Negative.    Skin:  Positive for rash.     Objective   /66   Pulse 105   Temp 37.2 °C (99 °F)   Resp 18   Wt 63 kg (139 lb)   SpO2 97%   BMI 20.53 kg/m²     Physical Exam  Vitals reviewed.   Constitutional:       General: She is not in acute distress.     Appearance: Normal appearance. She is not toxic-appearing.   HENT:      Head: Atraumatic.   Cardiovascular:      Rate and Rhythm: Normal rate and regular rhythm.      Heart sounds: Normal heart sounds. No murmur heard.  Pulmonary:      Effort: Pulmonary effort is normal.      Breath sounds: Normal breath sounds.   Skin:     General: Skin is warm and dry.      Findings: No erythema or rash.      Comments: No rashes or lesions appreciated at this time   Neurological:      Mental Status: She is alert.     Assessment/Plan   Problem List Items Addressed This Visit    None  Visit Diagnoses         Codes    Periorbital dermatitis    -  Primary L30.9    Relevant Orders    Referral to Dermatology        Patient has had an allergic reaction to betamethasone, so I would like to avoid this at this time. Patient is to continue taking zyrtec and she is to apply aquaphor liberally to the face as needed. Dermatology referral was placed and pt will see them on 10/31/23 (she declined an  appt for tomorrow). Advised follow up for any additional symptoms; she agreed. Advised ER for any worsening rash or new/concerning symptoms; she agreed.

## 2023-11-07 ENCOUNTER — APPOINTMENT (OUTPATIENT)
Dept: RADIOLOGY | Facility: HOSPITAL | Age: 23
End: 2023-11-07
Payer: COMMERCIAL

## 2023-11-16 ENCOUNTER — OFFICE VISIT (OUTPATIENT)
Dept: PRIMARY CARE | Facility: CLINIC | Age: 23
End: 2023-11-16
Payer: COMMERCIAL

## 2023-11-16 ENCOUNTER — LAB (OUTPATIENT)
Dept: LAB | Facility: LAB | Age: 23
End: 2023-11-16
Payer: COMMERCIAL

## 2023-11-16 VITALS
HEART RATE: 84 BPM | OXYGEN SATURATION: 100 % | HEIGHT: 69 IN | DIASTOLIC BLOOD PRESSURE: 72 MMHG | RESPIRATION RATE: 16 BRPM | WEIGHT: 138 LBS | SYSTOLIC BLOOD PRESSURE: 116 MMHG | TEMPERATURE: 98.6 F | BODY MASS INDEX: 20.44 KG/M2

## 2023-11-16 DIAGNOSIS — E22.1 HYPERPROLACTINEMIA (MULTI): ICD-10-CM

## 2023-11-16 DIAGNOSIS — R42 POSTURAL DIZZINESS WITH PRESYNCOPE: ICD-10-CM

## 2023-11-16 DIAGNOSIS — E72.59: ICD-10-CM

## 2023-11-16 DIAGNOSIS — F12.20 CANNABIS DEPENDENCE, UNCOMPLICATED (MULTI): ICD-10-CM

## 2023-11-16 DIAGNOSIS — F50.01 ANOREXIA NERVOSA, RESTRICTING TYPE (MULTI): ICD-10-CM

## 2023-11-16 DIAGNOSIS — R55 POSTURAL DIZZINESS WITH PRESYNCOPE: ICD-10-CM

## 2023-11-16 DIAGNOSIS — L30.9 DERMATITIS: ICD-10-CM

## 2023-11-16 DIAGNOSIS — R07.89 OTHER CHEST PAIN: Primary | ICD-10-CM

## 2023-11-16 DIAGNOSIS — D35.2 PROLACTINOMA (MULTI): ICD-10-CM

## 2023-11-16 LAB
ALBUMIN SERPL BCP-MCNC: 5 G/DL (ref 3.4–5)
ALP SERPL-CCNC: 35 U/L (ref 33–110)
ALT SERPL W P-5'-P-CCNC: 5 U/L (ref 7–45)
ANION GAP SERPL CALC-SCNC: 12 MMOL/L (ref 10–20)
AST SERPL W P-5'-P-CCNC: 12 U/L (ref 9–39)
BILIRUB SERPL-MCNC: 0.4 MG/DL (ref 0–1.2)
BUN SERPL-MCNC: 12 MG/DL (ref 6–23)
CALCIUM SERPL-MCNC: 9.9 MG/DL (ref 8.6–10.3)
CHLORIDE SERPL-SCNC: 105 MMOL/L (ref 98–107)
CO2 SERPL-SCNC: 28 MMOL/L (ref 21–32)
CREAT SERPL-MCNC: 0.69 MG/DL (ref 0.5–1.05)
ERYTHROCYTE [DISTWIDTH] IN BLOOD BY AUTOMATED COUNT: 13.3 % (ref 11.5–14.5)
GFR SERPL CREATININE-BSD FRML MDRD: >90 ML/MIN/1.73M*2
GLUCOSE SERPL-MCNC: 91 MG/DL (ref 74–99)
HCT VFR BLD AUTO: 35.5 % (ref 36–46)
HGB BLD-MCNC: 11.4 G/DL (ref 12–16)
MAGNESIUM SERPL-MCNC: 1.71 MG/DL (ref 1.6–2.4)
MCH RBC QN AUTO: 29.2 PG (ref 26–34)
MCHC RBC AUTO-ENTMCNC: 32.1 G/DL (ref 32–36)
MCV RBC AUTO: 91 FL (ref 80–100)
NRBC BLD-RTO: 0 /100 WBCS (ref 0–0)
PHOSPHATE SERPL-MCNC: 3.4 MG/DL (ref 2.5–4.9)
PLATELET # BLD AUTO: 285 X10*3/UL (ref 150–450)
POTASSIUM SERPL-SCNC: 4.2 MMOL/L (ref 3.5–5.3)
PROT SERPL-MCNC: 7.7 G/DL (ref 6.4–8.2)
RBC # BLD AUTO: 3.91 X10*6/UL (ref 4–5.2)
SODIUM SERPL-SCNC: 141 MMOL/L (ref 136–145)
WBC # BLD AUTO: 5.1 X10*3/UL (ref 4.4–11.3)

## 2023-11-16 PROCEDURE — 85027 COMPLETE CBC AUTOMATED: CPT

## 2023-11-16 PROCEDURE — 1036F TOBACCO NON-USER: CPT | Performed by: INTERNAL MEDICINE

## 2023-11-16 PROCEDURE — 84100 ASSAY OF PHOSPHORUS: CPT

## 2023-11-16 PROCEDURE — 99214 OFFICE O/P EST MOD 30 MIN: CPT | Performed by: INTERNAL MEDICINE

## 2023-11-16 PROCEDURE — 36415 COLL VENOUS BLD VENIPUNCTURE: CPT

## 2023-11-16 PROCEDURE — 93000 ELECTROCARDIOGRAM COMPLETE: CPT | Performed by: INTERNAL MEDICINE

## 2023-11-16 PROCEDURE — 83735 ASSAY OF MAGNESIUM: CPT

## 2023-11-16 PROCEDURE — 80053 COMPREHEN METABOLIC PANEL: CPT

## 2023-11-16 RX ORDER — CETIRIZINE HYDROCHLORIDE 10 MG/1
10 TABLET ORAL DAILY
Qty: 30 TABLET | Refills: 3 | Status: SHIPPED | OUTPATIENT
Start: 2023-11-16

## 2023-11-16 NOTE — PROGRESS NOTES
"Subjective   Ayesha I Ted \"Shelbi\" is a 23 y.o. female who presents for Skin Problem.    HPI   Reports h/o brandt-oral dermatitis.  Took Zyrtec and steroid topical in past.  Had flare up, Zyrtec ineffective.  [facility]  Recommended to continue Zyrtec.  Has resolved.  Requesting Derm referral.    Reports treatment for eating disorder per Psych.  Eating consistently daily.   Reports occasional chest pain.  Tightness on Sunday.  \"Like blows to chest\".  Lasted for several hours.  SOB, lightheaded during episodes.  2 weeks ago  Has had in the past  Was with grandma, woke up with chest tightness  Bp was 90/60 pulse was 64  Didn't feel normal  When got 5 min from appt felt like getting punched  Couldn't breath  Called mom   Didn't go to er  Resolved after an hour in therapist office  Had another episode  Has had chest pain   On staircase, has to sit down  Feels like heart is working hard  Can last for hours at a time  Has been starving self  Has been eating  Going to recovery clinic    Reports syncopal episodes then convulsions x2 weeks prior to chest pain.    Review of Systems   Constitutional:  Negative for fatigue and fever.   Respiratory:  Negative for cough and shortness of breath.    Cardiovascular:  Negative for chest pain and leg swelling.   All other systems reviewed and are negative.      Health Maintenance Due   Topic Date Due    Yearly Adult Physical  Never done    Hepatitis C Screening  Never done    Cervical Cancer Screening  Never done    COVID-19 Vaccine (3 - Moderna series) 05/16/2023    Influenza Vaccine (1) 09/01/2023       Objective   /72   Pulse 84   Temp 37 °C (98.6 °F)   Resp 16   Ht 1.753 m (5' 9\")   Wt 62.6 kg (138 lb)   LMP 11/01/2023   SpO2 100%   BMI 20.38 kg/m²     Physical Exam  Vitals and nursing note reviewed.   Constitutional:       Appearance: Normal appearance.   HENT:      Head: Normocephalic.   Eyes:      Conjunctiva/sclera: Conjunctivae normal.      " Pupils: Pupils are equal, round, and reactive to light.   Cardiovascular:      Rate and Rhythm: Normal rate and regular rhythm.      Pulses: Normal pulses.      Heart sounds: Normal heart sounds.   Pulmonary:      Effort: Pulmonary effort is normal.      Breath sounds: Normal breath sounds.   Musculoskeletal:         General: No swelling.      Cervical back: Neck supple.   Skin:     General: Skin is warm and dry.   Neurological:      General: No focal deficit present.      Mental Status: She is oriented to person, place, and time.         Assessment/Plan   Problem List Items Addressed This Visit       Prolactinoma (CMS/HCC)    Cannabis dependence, uncomplicated (CMS/HCC)    Hyperprolactinemia (CMS/HCC)    Hyperprolinemia (CMS/HCC)     Other Visit Diagnoses       Other chest pain    -  Primary    Relevant Orders    ECG 12 lead (Clinic Performed) (Completed)    Dermatitis        Relevant Medications    cetirizine (ZyrTEC) 10 mg tablet    Other Relevant Orders    Referral to Dermatology    Postural dizziness with presyncope        Relevant Orders    Comprehensive Metabolic Panel (Completed)    CBC (Completed)    Magnesium (Completed)    Phosphorus (Completed)    Anorexia nervosa, restricting type              Cont zyrtec   Refer to derm  EKG wnl  Will obs  Check labs to r/o tiana abn  Encouraged pt to continue psych care  Fu in one month due to complexity of situation

## 2023-11-17 ENCOUNTER — LAB (OUTPATIENT)
Dept: LAB | Facility: LAB | Age: 23
End: 2023-11-17
Payer: COMMERCIAL

## 2023-11-17 ENCOUNTER — TELEPHONE (OUTPATIENT)
Dept: PRIMARY CARE | Facility: CLINIC | Age: 23
End: 2023-11-17

## 2023-11-17 DIAGNOSIS — D50.8 OTHER IRON DEFICIENCY ANEMIA: Primary | ICD-10-CM

## 2023-11-17 DIAGNOSIS — E83.39 ADULT HYPOPHOSPHATASIA: ICD-10-CM

## 2023-11-17 PROCEDURE — 82131 AMINO ACIDS SINGLE QUANT: CPT

## 2023-11-17 PROCEDURE — 89240 UNLISTED MISC PATH TEST: CPT

## 2023-11-17 ASSESSMENT — ENCOUNTER SYMPTOMS
FATIGUE: 0
SHORTNESS OF BREATH: 0
FEVER: 0
COUGH: 0

## 2023-11-17 NOTE — RESULT ENCOUNTER NOTE
Call patient her labs look pretty good  Mild anemia  She has had in the past   Does she have heavy periods  Electrolytes are normal

## 2023-11-17 NOTE — TELEPHONE ENCOUNTER
----- Message from Jacquelin Moy DO sent at 11/17/2023  8:41 AM EST -----  Call patient her labs look pretty good  Mild anemia  She has had in the past   Does she have heavy periods  Electrolytes are normal

## 2023-11-17 NOTE — TELEPHONE ENCOUNTER
Pt made aware.  Periods are normal, not heavy.  Stated she did donate blood x3 weeks ago.  Aware labs to be rechecked.

## 2023-12-02 LAB — SCAN RESULT: NORMAL

## 2023-12-21 ENCOUNTER — OFFICE VISIT (OUTPATIENT)
Dept: PRIMARY CARE | Facility: CLINIC | Age: 23
End: 2023-12-21
Payer: COMMERCIAL

## 2023-12-21 VITALS
TEMPERATURE: 97.9 F | HEIGHT: 69 IN | WEIGHT: 135 LBS | BODY MASS INDEX: 19.99 KG/M2 | OXYGEN SATURATION: 98 % | DIASTOLIC BLOOD PRESSURE: 62 MMHG | HEART RATE: 68 BPM | SYSTOLIC BLOOD PRESSURE: 92 MMHG | RESPIRATION RATE: 16 BRPM

## 2023-12-21 DIAGNOSIS — R55 VASOVAGAL SYNCOPE: Primary | ICD-10-CM

## 2023-12-21 DIAGNOSIS — R07.2 PRECORDIAL PAIN: ICD-10-CM

## 2023-12-21 PROCEDURE — 99214 OFFICE O/P EST MOD 30 MIN: CPT | Performed by: INTERNAL MEDICINE

## 2023-12-21 PROCEDURE — 1036F TOBACCO NON-USER: CPT | Performed by: INTERNAL MEDICINE

## 2023-12-21 ASSESSMENT — ENCOUNTER SYMPTOMS
COUGH: 0
FEVER: 0
FATIGUE: 1
SHORTNESS OF BREATH: 1

## 2023-12-21 ASSESSMENT — PAIN SCALES - GENERAL: PAINLEVEL: 1

## 2023-12-21 NOTE — PROGRESS NOTES
"Subjective   Ayesha I Ted \"Shelbi\" is a 23 y.o. female who presents for 1 month Follow-up.    HPI   Reports continued chest pain.  Feels like unable to take deep breath, heart not functioning to capacity.  Not as often for past x2 days.  Some continued dizziness, pre syncope.  Denies further syncopal episodes.    Eating is improving.  Continues therapy w/Psych.    Review of Systems   Constitutional:  Positive for fatigue. Negative for fever.   Respiratory:  Positive for shortness of breath. Negative for cough.    Cardiovascular:  Positive for chest pain. Negative for leg swelling.   All other systems reviewed and are negative.      Health Maintenance Due   Topic Date Due    Yearly Adult Physical  Never done    Hepatitis C Screening  Never done    Cervical Cancer Screening  Never done    COVID-19 Vaccine (3 - Moderna series) 05/16/2023    Influenza Vaccine (1) 09/01/2023       Objective   BP 92/62   Pulse 68   Temp 36.6 °C (97.9 °F)   Resp 16   Ht 1.753 m (5' 9\")   Wt 61.2 kg (135 lb)   SpO2 98%   BMI 19.94 kg/m²     Physical Exam  Vitals and nursing note reviewed.   Constitutional:       Appearance: Normal appearance.   HENT:      Head: Normocephalic.   Eyes:      Conjunctiva/sclera: Conjunctivae normal.      Pupils: Pupils are equal, round, and reactive to light.   Cardiovascular:      Rate and Rhythm: Normal rate and regular rhythm.      Pulses: Normal pulses.      Heart sounds: Normal heart sounds.      Comments: Chest wall tenderness on palpation  Pulmonary:      Effort: Pulmonary effort is normal.      Breath sounds: Normal breath sounds.   Musculoskeletal:         General: No swelling.      Cervical back: Neck supple.   Skin:     General: Skin is warm and dry.   Neurological:      General: No focal deficit present.      Mental Status: She is oriented to person, place, and time.         Assessment/Plan   Problem List Items Addressed This Visit    None  Visit Diagnoses       Vasovagal syncope    -  " Primary    Relevant Orders    Referral to Cardiology    Transthoracic Echo (TTE) Limited    Precordial pain              Reviewed testing  Will order echo  Discussed with pt and mom due to multiple somatic symtpoms this may be either musculoskeletal but testing needs to be done before conculding this  Cont meds  Discussed with pt aldactone   Rec she at least cut in half   hydrate

## 2023-12-29 ENCOUNTER — HOSPITAL ENCOUNTER (OUTPATIENT)
Dept: CARDIOLOGY | Facility: CLINIC | Age: 23
Discharge: HOME | End: 2023-12-29
Payer: COMMERCIAL

## 2023-12-29 VITALS
WEIGHT: 135 LBS | DIASTOLIC BLOOD PRESSURE: 70 MMHG | HEIGHT: 69 IN | BODY MASS INDEX: 19.99 KG/M2 | SYSTOLIC BLOOD PRESSURE: 122 MMHG

## 2023-12-29 DIAGNOSIS — R55 VASOVAGAL SYNCOPE: ICD-10-CM

## 2023-12-29 LAB
AORTIC VALVE MEAN GRADIENT: 4
AORTIC VALVE PEAK VELOCITY: 1.32
AV PEAK GRADIENT: 7
AVA (PEAK VEL): 1.93
AVA (VTI): 1.83
EJECTION FRACTION APICAL 4 CHAMBER: 67.2
LEFT VENTRICLE INTERNAL DIMENSION DIASTOLE: 4.72 (ref 3.5–6)
LEFT VENTRICULAR OUTFLOW TRACT DIAMETER: 1.8
MITRAL VALVE E/A RATIO: 1.82
MITRAL VALVE E/E' RATIO: 5
RIGHT VENTRICLE PEAK SYSTOLIC PRESSURE: 18.5

## 2023-12-29 PROCEDURE — 93308 TTE F-UP OR LMTD: CPT | Performed by: INTERNAL MEDICINE

## 2023-12-29 PROCEDURE — 93308 TTE F-UP OR LMTD: CPT

## 2024-01-02 ENCOUNTER — TELEPHONE (OUTPATIENT)
Dept: PRIMARY CARE | Facility: CLINIC | Age: 24
End: 2024-01-02
Payer: COMMERCIAL

## 2024-01-02 NOTE — TELEPHONE ENCOUNTER
----- Message from Jacquelin Moy DO sent at 1/2/2024 10:28 AM EST -----  Call patient echo is normal

## 2024-01-16 ENCOUNTER — TELEPHONE (OUTPATIENT)
Dept: PRIMARY CARE | Facility: CLINIC | Age: 24
End: 2024-01-16
Payer: COMMERCIAL

## 2024-01-16 DIAGNOSIS — B00.1 COLD SORE: ICD-10-CM

## 2024-01-16 RX ORDER — VALACYCLOVIR HYDROCHLORIDE 500 MG/1
500 TABLET, FILM COATED ORAL DAILY
Qty: 90 TABLET | Refills: 0 | Status: SHIPPED | OUTPATIENT
Start: 2024-01-16 | End: 2024-04-24 | Stop reason: SDUPTHER

## 2024-01-16 NOTE — TELEPHONE ENCOUNTER
valACYclovir (Valtrex) 500 mg tablet she forgot to get a refill. Been a week since she has had it. She didn't know if she needs to do anything else? Send to [id number] - [place], [place] - [address] Please advise

## 2024-01-25 ENCOUNTER — OFFICE VISIT (OUTPATIENT)
Dept: SLEEP MEDICINE | Facility: CLINIC | Age: 24
End: 2024-01-25
Payer: COMMERCIAL

## 2024-01-25 VITALS
SYSTOLIC BLOOD PRESSURE: 95 MMHG | OXYGEN SATURATION: 98 % | DIASTOLIC BLOOD PRESSURE: 69 MMHG | HEART RATE: 107 BPM | HEIGHT: 69 IN | BODY MASS INDEX: 19.94 KG/M2 | RESPIRATION RATE: 16 BRPM

## 2024-01-25 DIAGNOSIS — G47.30 SLEEP DISORDER BREATHING: Primary | ICD-10-CM

## 2024-01-25 DIAGNOSIS — G47.9 SLEEP DISTURBANCE: ICD-10-CM

## 2024-01-25 PROCEDURE — 1036F TOBACCO NON-USER: CPT | Performed by: GENERAL PRACTICE

## 2024-01-25 PROCEDURE — 99205 OFFICE O/P NEW HI 60 MIN: CPT | Performed by: GENERAL PRACTICE

## 2024-01-25 ASSESSMENT — ENCOUNTER SYMPTOMS
DEPRESSION: 1
LOSS OF SENSATION IN FEET: 1
OCCASIONAL FEELINGS OF UNSTEADINESS: 1

## 2024-01-25 ASSESSMENT — COLUMBIA-SUICIDE SEVERITY RATING SCALE - C-SSRS
6. HAVE YOU EVER DONE ANYTHING, STARTED TO DO ANYTHING, OR PREPARED TO DO ANYTHING TO END YOUR LIFE?: NO
1. IN THE PAST MONTH, HAVE YOU WISHED YOU WERE DEAD OR WISHED YOU COULD GO TO SLEEP AND NOT WAKE UP?: NO
2. HAVE YOU ACTUALLY HAD ANY THOUGHTS OF KILLING YOURSELF?: NO

## 2024-01-25 ASSESSMENT — PATIENT HEALTH QUESTIONNAIRE - PHQ9
2. FEELING DOWN, DEPRESSED OR HOPELESS: NOT AT ALL
1. LITTLE INTEREST OR PLEASURE IN DOING THINGS: NOT AT ALL
SUM OF ALL RESPONSES TO PHQ9 QUESTIONS 1 AND 2: 0

## 2024-01-25 NOTE — PATIENT INSTRUCTIONS
[facility]   [facility]  [address] [facility] [address]  [phone]       NAME: Ayesha Jean   DATE: 1/25/2024     Your Sleep Provider Today: Gt Wong,   Your Primary Care Physician: Jacquelin Moy DO   Your Referring Provider: No ref. provider found    DIAGNOSIS:   No diagnosis found.    Thank you for coming to the Sleep Medicine Clinic today! Your sleep medicine provider today was: Gt Wong DO Below is a summary of your treatment plan, other important information, and our contact numbers:      TREATMENT PLAN     Follow up after sleep study or sooner as needed.     keep a steady schedule, try to regulate your wake up time. Keep a sleep log.   please minimize/eliminate caffeine, nicotine, and alcohol  Avoid daytime naps.  decrease stimulating activity for at least two hours prior to the desired sleep onset time.   get morning light exposure as much as possible especially when you first wake up  decrease light exposure as much as possible in the evening and around bedtime.   Keep a regular eating schedule.   Eat a healthy diet and exercise as tolerated.      IMPORTANT INFORMATION     Call 911 for medical emergencies.  Our offices are generally open from Monday-Friday, 9 am - 5 pm.  If you need to get in touch with me, you may either call me and my team(number is below) or you can use [facility].  If a referral for a test, for CPAP, or for another specialist was made, and you have not heard about scheduling this within a week, please call scheduling at [phone]).  If you are unable to make your appointment for clinic or an overnight study, kindly call the office at least 48 hours in advance to cancel and reschedule.  If you are on CPAP, please bring your device's card or the device to each clinic appointment.   There are no supporting services by either the sleep doctors or their staff on weekends and Holidays, or after 5  PM on weekdays.   If you have been asked to come to a sleep study, make sure you bring toiletries, a comfy pillow, and any nighttime medications that you may regularly take. Also be sure to eat dinner before you arrive. We generally do not provide meals.      PRESCRIPTIONS     We require 7 days advanced notice for prescription refills. If we do not receive the request in this time, we cannot guarantee that your medication will be refilled in time.      IMPORTANT PHONE NUMBERS     Sleep Medicine Clinic Fax: [phone]  Appointments (for Pediatric Sleep Clinic): [phone] - option 1  Appointments (for Adult Sleep Clinic): [phone] - option 2  Appointments (For Sleep Studies): [phone] - option 3  Behavioral Sleep Medicine: [phone]  Sleep Surgery: [phone]  ENT (Otolaryngology): [phone]  Headache Clinic (Neurology): [phone]  Neurology: [phone]  Psychiatry: [phone]  Pulmonary Function Testing (PFT) Center: [phone]  Pulmonary Medicine: [phone]  [facility] (DME): [phone]  [facility] (DME): [phone]   (DME): [phone]      OUR ADULT SLEEP MEDICINE TEAM   Please do not hesitate to call the office or sleep nurse with any questions between appointments:    Adult Sleep Nurses (Radha Ruby, RN and Shahida Matos RN):  For clinical questions and refilling prescriptions: [phone]  Email sleep diaries and other documents at: [email or web address]    Adult Sleep Medicine Secretaries:  Madeline Hannah (For Jazzy/Bar/Krise/[place]ro[place]/Yeh/Wilson):   P: [phone]  F: [phone]  Lia Granados (For Gu[place]): P: [phone]  Fax: [phone]  Laura Quigley (For Jurcevic/Blank): P: [phone]  F: [phone]  Kimberly Kurtz (For Jax): P: [phone]  F: [phone]  Ofe Marina (For Lucy/Fauzia/Judith): P: [phone]  F: [phone]  Charlotte Mancia (For  "Augusto/Osbaldo): P: [phone]  F: [phone]     Adult Sleep Medicine Advanced Practice Providers:  Darrick Butts ([place], [place])  Barb Cage ([facility])  Chloe Ag CNP ([place], [place], [place])  Alea Casas CNP ([place], [place], [place])  Kena Camara ([place], [place], [place])  Jama Roblero CNP ([place], [place])        OUR SLEEP TESTING LOCATIONS     Our team will contact you to schedule your sleep study, however, you can contact us as follow:  Main Phone Line (scheduling only): [phone]), option 3  Adult and Pediatric Locations   [place] (6 years and older): [facility] - 4th floor ([address]) After hours line: [phone]  [facility] (Main campus: All ages): [facility], 6th floor. After hours line: [phone]   [place] (5 years and older; younger considered on case-by-case basis): [address], [address]. Scheduling  After hours line: [phone]   [place] (6 years and older): [address]; [address]   [place] (6 years and older): [address]  After hours line: [phone]   [place] (13 years and older) in [place]: [address], 2nd floor  After hours line: [phone]   [place] (13 year and older): [address]  After hours line: [phone]     Adult Only Locations:   [place] (18 years and older): [address] [place] [address], 2nd floor   [place] (18 years and older): [address] [facility] [address] [facility]; 4th floor  After hours line: [phone]   [place] (18 years and older) at [place]: [address] [facility]  After hours line: [phone]          CONTACTING YOUR SLEEP MEDICINE PROVIDER     Send a message directly to your provider through \"My Chart\", which is the email service through your  Records Account: [email or web address]   Call [phone] " "and leave a message. One of the administrative assistants will forward the message to your sleep medicine provider through \"My Chart\" and/or email.     Your sleep medicine provider for this visit was: Gt Wong DO        "

## 2024-01-25 NOTE — PROGRESS NOTES
"     Patient: Ayesha Jean    [id number]  : 2000 -- AGE 23 y.o.    Provider: Gt Wong DO     [facility] [place]   Service Date: 2024              [facility]  New Visit Note        HISTORY OF PRESENT ILLNESS     The patient's referring provider is:  Dr. Hitesh Michaels    HISTORY OF PRESENT ILLNESS   Ayesha Jean \"Shelbi\" is a 23 y.o. female who presents to a [facility] for a sleep medicine evaluation with concerns of Referral (Hard time falling asleep, hard to wake up. Hard to stay asleep due to possible fibromyalgia.  ).     The patient  has a past medical history of ADHD (attention deficit hyperactivity disorder) (), Allergic, Anemia, Anxiety, Arthritis, Cervicalgia (2021), Depression, Hypertrophy of breast (2021), Hypertrophy of breast (2021), Personal history of other diseases of the musculoskeletal system and connective tissue (2020), Personal history of other diseases of the respiratory system (10/10/2022), Personal history of other mental and behavioral disorders, Substance abuse (CMS/[facility]), and Visual impairment..    SLEEP HISTORY    Pmhx includes anorexia, anxiety/ depression/ borderline, addiction, ADHD on focalin xr in am and immediate release latest 2pm, fibromyalgia?, pituitary tumor? PCOS.    On today's visit, 2024, the patient reports:    Patient has difficulty waking up in the morning and difficulty falling asleep at night as well as night time awakenings.     Her bedtime routine involves : talking to boyfriend who lives in a different time zone, then watching TV.    She has been under a lot of stress.   Her brother passed away, her brother's child and wife live with patient and her family.   Grandfather with dementia also lives at home. 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     She watches TV to decompress.     Exercise: " 5 min exercise   She is anorexic and does not eat on regular basis but is trying to improve her eating habits.     She is on focalin xr and focalin for her ADHD.     Sleep schedule  on weekdays / work days:  Usual Bedtime: 12:30am   Sleep latency: 30min   Wake time : 7 am -11am   Total sleep time average/day: varies hours/day  Awakenings: 3-4x per night, hip and back pain, 1 hr each  Naps: none   Sleep aid: klonopin prn, saphris, clonidine, also on lexapro.      Sleep schedule  on weekends/non work days :  Same as above.     Occupation: college student, social work but switching to business. Lives with parents. Goes to [employer].     Preferred sleeping position: SLEEP POSITION: supine and sidelying    Sleep-related ROS:    Snoring: occasionally   Witnessed apneas:      N    Gasping/ chocking: Y          Am Dry mouth:    N         Nasal congestion:       sometimes  am headaches: N    Sleep is described as un-refreshing.     Fatigue or decreased energy: Y  Difficulty remembering things in daytime: Y  Difficulty staying focused in daytime: N  Irritable during the day: Y  Drowsy driving: sometimes feels sleepy while driving but pulls over.   Hx of car accident: N  Near-miss Car accident: N      RLS screen:  RLSSCREEN: - Sensations: Patient does not have unusual sensations in their extremities that cause an urge to move them     Sleep-related behaviors: DENIES  Sometimes yells in sleep.     Sleep environment:  Bed partner :  N  Pets in bed :   2 cats   Bedroom temperature: BEDROOM TEMP: cool  Noise :   sometimes, grandfather with dementia.   Issues with bed comfort : Y    Daytime Symptoms      ESS: 1        REVIEW OF SYSTEMS     REVIEW OF SYSTEMS  Review of Systems   All other systems reviewed and are negative.          ALLERGIES AND MEDICATIONS     ALLERGIES  Allergies   Allergen Reactions    Fluoxetine Other     Other reaction(s): suicidal    causes depression per mom    Lurasidone Other     Other  reaction(s): over dose/suicidal   suicidal    Sertraline Other     Other reaction(s): suicidal    causes depression per mom    Abreva [Docosanol] Unknown    Betamethasone Other    Bupropion Hcl Unknown    Codeine Unknown    Lamotrigine Unknown     Other reaction(s): Mental Status Change   Suicidal    Nortriptyline Unknown    Paroxetine Hcl Unknown    Quetiapine Hives and Unknown    Zonisamide Unknown       MEDICATIONS  Current Outpatient Medications   Medication Sig Dispense Refill    aluminum chloride (Drysol) 20 % external solution Apply topically once daily at bedtime. 60 mL 11    asenapine (Saphris) SL tablet Dissolve 1 tablet under the tongue every morning. And dissolve 2 tablets at bedtime      Belsomra 10 mg tablet Take 1 tablet (10 mg) by mouth once daily at bedtime.      cabergoline (Dostinex) 0.5 mg tablet Take 1 tablet (0.5 mg) by mouth 5 times a week.      cetirizine (ZyrTEC) 10 mg tablet Take 1 tablet (10 mg) by mouth once daily. 30 tablet 3    clonazePAM (KlonoPIN) 2 mg tablet Take 1 tablet (2 mg) by mouth as needed at bedtime for anxiety.      cloNIDine (Catapres) 0.2 mg tablet Take 1 tablet (0.2 mg) by mouth once daily at bedtime.      dexmethylphenidate XR (Focalin XR) 40 mg 24 hr capsule Take 1 capsule (40 mg) by mouth once daily. Do not crush, chew, or split.      ergocalciferol (Vitamin D-2) 1.25 MG (83279 UT) capsule Take 1 capsule (1,250 mcg) by mouth 1 (one) time per week.      escitalopram (Lexapro) 10 mg tablet Take 1 tablet by mouth once daily. in addition to to 20mg daily      escitalopram (Lexapro) 20 mg tablet Take 1 tablet by mouth once daily. in addition to 10 mg daily      fluticasone (Flonase) 50 mcg/actuation nasal spray Administer 2 sprays into affected nostril(s) once daily.      lidocaine (Lidoderm) 5 % patch Place 1 patch on the skin once daily. Remove & discard patch within 12 hours or as directed by MD.      Linzess 290 mcg capsule Take 1 capsule (290 mcg) by mouth once daily.       metFORMIN XR (Glucophage-XR) 750 mg 24 hr tablet Take 1 tablet (750 mg) by mouth 2 times a day.      multivit-min/ferrous fumarate (MULTI VITAMIN ORAL) Take by mouth.      naproxen (Naprosyn) 500 mg tablet TAKE 1 TABLET BY MOUTH TWICE DAILY AS NEEDED FOR PAIN WITH FOOD      polyethylene glycol (Glycolax) 17 gram packet Take 17 g by mouth 2 times a day as needed.      spironolactone (Aldactone) 50 mg tablet Take 1 tablet (50 mg) by mouth 2 times a day.      valACYclovir (Valtrex) 500 mg tablet Take 1 tablet (500 mg) by mouth once daily. 90 tablet 0    albuterol (ProAir HFA) 90 mcg/actuation inhaler Inhale 2 puffs every 4 hours if needed for wheezing or shortness of breath. (Patient not taking: Reported on 11/16/2023) 8.5 g 0     No current facility-administered medications for this visit.         PAST HISTORY     PAST MEDICAL HISTORY  She  has a past medical history of ADHD (attention deficit hyperactivity disorder) (2006), Allergic, Anemia, Anxiety, Arthritis, Cervicalgia (04/09/2021), Depression, Hypertrophy of breast (04/09/2021), Hypertrophy of breast (04/09/2021), Personal history of other diseases of the musculoskeletal system and connective tissue (09/28/2020), Personal history of other diseases of the respiratory system (10/10/2022), Personal history of other mental and behavioral disorders, Substance abuse (CMS/[facility]), and Visual impairment.      PAST SURGICAL HISTORY:  Past Surgical History:   Procedure Laterality Date    ADENOIDECTOMY      BREAST SURGERY      OTHER SURGICAL HISTORY  05/13/2020    Tonsillectomy    TONSILLECTOMY      WISDOM TOOTH EXTRACTION         FAMILY HISTORY  No family history on file.  Adopted from [place].     SOCIAL HISTORY  She  reports that she has quit smoking. Her smoking use included cigarettes. She has never used smokeless tobacco. She reports that she does not currently use alcohol. She reports that she does not currently use drugs.     Caffeine consumption: no   Alcohol  "consumption: No  Smoking: former smoker.   Marijuana: smokes CBD to help her fibromyalgia; helps her not relapse   Other drugs: recovering from drug use: marijuana , cocaine, heroin, prescription meds.       PHYSICAL EXAM     VITAL SIGNS: BP 95/69   Pulse 107   Resp 16   Ht 1.753 m (5' 9\")   SpO2 98%   BMI 19.94 kg/m²      PREVIOUS WEIGHTS:  Wt Readings from Last 3 Encounters:   12/29/23 61.2 kg (135 lb)   12/21/23 61.2 kg (135 lb)   11/16/23 62.6 kg (138 lb)       Physical Exam  Constitutional: Alert and oriented, cooperative, no obvious distress.   HENT: normocephalic.   Eyes: PERRLA, nonicteric   Neck: Supple, trachea midline   respiratory: CTA bilaterally, no wheezing/ crackles/ cough  Cardiac: no rub/ gallops  GI:BS in all 4 quadrants, Soft, nontender, no masses  musculoskeletal/ Extremities: No clubbing  integumentary: no significant rashes observed.   Neurologic: AOx3.   psychiatric: calm.         RESULTS/DATA         ASSESSMENT/PLAN     Ms. Jean is a 23 y.o. female and  has a past medical history of ADHD (attention deficit hyperactivity disorder) (2006), Allergic, Anemia, Anxiety, Arthritis, Cervicalgia (04/09/2021), Depression, Hypertrophy of breast (04/09/2021), Hypertrophy of breast (04/09/2021), Personal history of other diseases of the musculoskeletal system and connective tissue (09/28/2020), Personal history of other diseases of the respiratory system (10/10/2022), Personal history of other mental and behavioral disorders, Substance abuse (CMS/[facility]), and Visual impairment. She was referred to the [facility] for evaluation of difficulties with sleep.     Problem List Items Addressed This Visit    None      Problem List and Orders    Pmhx includes anorexia, anxiety/ depression/ borderline, addiction, ADHD on focalin xr in am and immediate release latest 2pm, fibromyalgia?, pituitary tumor? PCOS.    1- sleep disorder breathing.   Symptoms include Nocturia, night time " awakenings, sometimes snoring.   -adopted thus no known family hx.     -ordered sleep study    -do not drive or operate heavy machinery if drowsy.  -avoid sedating substances/ medication, alcohol, illicit drugs and tobacco.      2- irregular sleep schedule    likely multifactorial. Etiology could include psych problems, untreated sleep apnea, medication/ caffeine, circadian rhythm problems, poor sleepy hygiene.  Of note, patient is on medication that helps her sleep but is also on stimulants.     -she watches TV in bed and talks to boyfriend in bed --> will try to get in bed when she is ready to fall asleep.   -has anorexia but will try to eat more regularly.   -will develop a bedtime routine, she thinks a shower at night will help her relax.   -will try to wake up at the same time every day.   -she will keep a sleep diary.     Counseled on good sleep hygiene practices.     keep a steady schedule, try to regulate your wake up time. Keep a sleep log.   please minimize/eliminate caffeine, nicotine, and alcohol  Avoid daytime naps.  decrease stimulating activity for at least two hours prior to the desired sleep onset time.   get morning light exposure as much as possible especially when you first wake up  decrease light exposure as much as possible in the evening and around bedtime.   Keep a regular eating schedule.   Eat a healthy diet and exercise as tolerated.    Follow up after sleep study or sooner as needed.

## 2024-01-31 ENCOUNTER — HOSPITAL ENCOUNTER (OUTPATIENT)
Dept: CARDIOLOGY | Facility: CLINIC | Age: 24
Discharge: HOME | End: 2024-01-31
Payer: COMMERCIAL

## 2024-01-31 ENCOUNTER — OFFICE VISIT (OUTPATIENT)
Dept: CARDIOLOGY | Facility: CLINIC | Age: 24
End: 2024-01-31
Payer: COMMERCIAL

## 2024-01-31 VITALS
HEIGHT: 69 IN | HEART RATE: 96 BPM | OXYGEN SATURATION: 95 % | BODY MASS INDEX: 20.13 KG/M2 | WEIGHT: 135.9 LBS | SYSTOLIC BLOOD PRESSURE: 122 MMHG | DIASTOLIC BLOOD PRESSURE: 82 MMHG

## 2024-01-31 DIAGNOSIS — R55 VASOVAGAL SYNCOPE: ICD-10-CM

## 2024-01-31 DIAGNOSIS — R00.2 PALPITATIONS: Primary | ICD-10-CM

## 2024-01-31 PROBLEM — K03.2: Status: ACTIVE | Noted: 2024-01-31

## 2024-01-31 PROBLEM — R42 POSTURAL LIGHTHEADEDNESS: Status: ACTIVE | Noted: 2023-11-16

## 2024-01-31 PROBLEM — E83.39 HYPOPHOSPHATASIA: Status: ACTIVE | Noted: 2024-01-31

## 2024-01-31 PROBLEM — R21 RASH: Status: ACTIVE | Noted: 2024-01-31

## 2024-01-31 PROBLEM — R25.1 TREMOR: Status: ACTIVE | Noted: 2024-01-31

## 2024-01-31 PROCEDURE — 93246 EXT ECG>7D<15D RECORDING: CPT

## 2024-01-31 PROCEDURE — 93005 ELECTROCARDIOGRAM TRACING: CPT | Performed by: INTERNAL MEDICINE

## 2024-01-31 PROCEDURE — 99214 OFFICE O/P EST MOD 30 MIN: CPT | Performed by: INTERNAL MEDICINE

## 2024-01-31 PROCEDURE — 1036F TOBACCO NON-USER: CPT | Performed by: INTERNAL MEDICINE

## 2024-01-31 PROCEDURE — 93248 EXT ECG>7D<15D REV&INTERPJ: CPT | Performed by: INTERNAL MEDICINE

## 2024-01-31 PROCEDURE — 93010 ELECTROCARDIOGRAM REPORT: CPT | Performed by: INTERNAL MEDICINE

## 2024-01-31 PROCEDURE — 99204 OFFICE O/P NEW MOD 45 MIN: CPT | Performed by: INTERNAL MEDICINE

## 2024-01-31 ASSESSMENT — PAIN SCALES - GENERAL: PAINLEVEL: 3

## 2024-01-31 NOTE — PROGRESS NOTES
Name : Ayesha Jean    : 2000   MRN : [id number]   ENC Date : 24     Reason for visit: Palpitations, chest pain, vasovagal syncope    Assessment and Plan:  Chest pain: I reassured the patient that I did not think she had cardiac chest pain from coronary artery disease or any other significant cardiac abnormality.  It is possible her chest pain is anxiety related or may simply be symptomatic premature beats.  I did recommend a 14-day event monitor to assess frequency of any arrhythmias as well as to see if there is any correlation between her symptoms and any rhythm disturbance.  She was agreeable with that plan.  It is also possible with her eating disorder she may have some esophageal erosion causing esophageal spasm that could be a cause for her discomfort as well.  Trace tricuspid and mitral regurgitation: I assured the patient that this is a normal finding and is not indicative of any heart disease.  Palpitations: As above probably PACs or PVCs.  I did explain that I would prefer not to treat this as the side effects of the medications oftentimes are worse than the symptom we would be treating.  We will try to assess her true arrhythmia burden as described above with the 14-day monitor.  Disp: Phone follow-up with monitor results if unremarkable.  If abnormal return to clinic.      HPI:  Patient is seen today for evaluation of chest discomfort palpitations and near syncope.  Patient has a series of symptoms that have occurred with waxing and waning frequency and severity over the last couple of years.  She describes a chest heaviness and pressure associated with the sensation of not being able to take a deep breath.  When oxygen saturations and heart rates are checked these tend to be normal.  She also describes a pounding in her chest oftentimes when she first wakes up in the morning.  Though she does admit to having some anxiety she feels that she keeps this under reasonably good control and  states that she will have the chest discomfort even when she is having fun and not anxious in any way.  She has had some near syncopal to syncopal events in the past.  She does state that her frequency and severity of symptoms will wax and wane without any particular pattern.  She has not found any triggers such as caffeine alcohol or anything else that tends to make the symptoms better or worse.      Problem List:   Patient Active Problem List   Diagnosis    Attention deficit hyperactivity disorder (ADHD)    Bipolar depression (CMS/HCC)    Chronic constipation    Generalized headaches    Hypothyroidism    PCOS (polycystic ovarian syndrome)    Prolactinoma (CMS/HCC)    PTSD (post-traumatic stress disorder)    Status post bilateral breast reduction    Osteoarthritis of spine with radiculopathy, cervical region    Anxiety    Bipolar I disorder, severe, current or most recent episode depressed, with psychotic features (CMS/HCC)    Borderline personality disorder (CMS/HCC)    Cannabis abuse    Cannabis dependence, uncomplicated (CMS/HCC)    Cocaine dependence, uncomplicated (CMS/HCC)    Hyperprolactinemia (CMS/HCC)    Hyperprolinemia (CMS/[facility])    PMDD (premenstrual dysphoric disorder)    Overdose of tricyclic antidepressants    Obesity, Class I, BMI 30-34.9    MDD (major depressive disorder)    Suicide attempt (CMS/HCC)    ADHD (attention deficit hyperactivity disorder), combined type    Schizoaffective disorder, bipolar type (CMS/[facility])    Erosion of teeth due to medicine    Hypophosphatasia    Postural lightheadedness    Rash    Tremor    Vasovagal syncope        Meds:   Current Outpatient Medications on File Prior to Visit   Medication Sig Dispense Refill    aluminum chloride (Drysol) 20 % external solution Apply topically once daily at bedtime. 60 mL 11    asenapine (Saphris) SL tablet Dissolve 1 tablet under the tongue every morning. And dissolve 2 tablets at bedtime      Belsomra 10 mg tablet Take 1 tablet (10 mg)  by mouth once daily at bedtime.      cabergoline (Dostinex) 0.5 mg tablet Take 1 tablet (0.5 mg) by mouth 5 times a week.      cetirizine (ZyrTEC) 10 mg tablet Take 1 tablet (10 mg) by mouth once daily. 30 tablet 3    clonazePAM (KlonoPIN) 2 mg tablet Take 1 tablet (2 mg) by mouth as needed at bedtime for anxiety.      cloNIDine (Catapres) 0.2 mg tablet Take 1 tablet (0.2 mg) by mouth once daily at bedtime.      dexmethylphenidate XR (Focalin XR) 40 mg 24 hr capsule Take 1 capsule (40 mg) by mouth once daily. Do not crush, chew, or split.      ergocalciferol (Vitamin D-2) 1.25 MG (41860 UT) capsule Take 1 capsule (1,250 mcg) by mouth 1 (one) time per week.      escitalopram (Lexapro) 10 mg tablet Take 1 tablet by mouth once daily. in addition to to 20mg daily      escitalopram (Lexapro) 20 mg tablet Take 1 tablet by mouth once daily. in addition to 10 mg daily      fluticasone (Flonase) 50 mcg/actuation nasal spray Administer 2 sprays into affected nostril(s) once daily.      lidocaine (Lidoderm) 5 % patch Place 1 patch on the skin once daily. Remove & discard patch within 12 hours or as directed by MD.      Linzess 290 mcg capsule Take 1 capsule (290 mcg) by mouth once daily.      metFORMIN XR (Glucophage-XR) 750 mg 24 hr tablet Take 1 tablet (750 mg) by mouth 2 times a day.      multivit-min/ferrous fumarate (MULTI VITAMIN ORAL) Take by mouth.      naproxen (Naprosyn) 500 mg tablet TAKE 1 TABLET BY MOUTH TWICE DAILY AS NEEDED FOR PAIN WITH FOOD      polyethylene glycol (Glycolax) 17 gram packet Take 17 g by mouth 2 times a day as needed.      spironolactone (Aldactone) 50 mg tablet Take 1 tablet (50 mg) by mouth 2 times a day.      valACYclovir (Valtrex) 500 mg tablet Take 1 tablet (500 mg) by mouth once daily. 90 tablet 0    [DISCONTINUED] albuterol (ProAir HFA) 90 mcg/actuation inhaler Inhale 2 puffs every 4 hours if needed for wheezing or shortness of breath. 8.5 g 0     No current facility-administered  "medications on file prior to visit.       All:   Allergies   Allergen Reactions    Fluoxetine Other     Other reaction(s): suicidal    causes depression per mom    Lurasidone Other     Other reaction(s): over dose/suicidal   suicidal    Sertraline Other     Other reaction(s): suicidal    causes depression per mom    Abreva [Docosanol] Unknown    Betamethasone Other    Bupropion Hcl Unknown    Codeine Unknown    Lamotrigine Unknown     Other reaction(s): Mental Status Change   Suicidal    Nortriptyline Unknown    Paroxetine Hcl Unknown    Quetiapine Hives and Unknown    Zonisamide Unknown       Fam Hx: No family history on file.    Soc Hx:   Social History     Socioeconomic History    Marital status: Single     Spouse name: Not on file    Number of children: Not on file    Years of education: Not on file    Highest education level: Not on file   Occupational History    Not on file   Tobacco Use    Smoking status: Former     Types: Cigarettes    Smokeless tobacco: Never   Substance and Sexual Activity    Alcohol use: Not Currently    Drug use: Not Currently     Comment: uses cbd but with no thc    Sexual activity: Yes     Partners: Male     Birth control/protection: I.U.D.   Other Topics Concern    Not on file   Social History Narrative    Not on file     Social Determinants of Health     Financial Resource Strain: Not on file   Food Insecurity: Not on file   Transportation Needs: Not on file   Physical Activity: Not on file   Stress: Not on file   Social Connections: Not on file   Intimate Partner Violence: Not on file   Housing Stability: Not on file       ROS    VS: /82 (BP Location: Right arm, Patient Position: Sitting, BP Cuff Size: Adult)   Pulse 96   Ht 1.753 m (5' 9\")   Wt 61.6 kg (135 lb 14.4 oz)   SpO2 95%   BMI 20.07 kg/m²      Physical Exam  Vitals reviewed.   Constitutional:       Appearance: Normal appearance.   Eyes:      Pupils: Pupils are equal, round, and reactive to light.   Neck:      " Vascular: No JVD.   Cardiovascular:      Rate and Rhythm: Normal rate and regular rhythm.      Pulses: Normal pulses.      Heart sounds: No murmur heard.     No gallop.   Pulmonary:      Effort: No respiratory distress.      Breath sounds: No wheezing or rales.   Abdominal:      General: Abdomen is flat. There is no distension.      Palpations: Abdomen is soft.   Musculoskeletal:         General: No swelling.      Right lower leg: No edema.      Left lower leg: No edema.   Neurological:      General: No focal deficit present.      Mental Status: She is alert.   Psychiatric:         Mood and Affect: Mood normal.          No results found for this or any previous visit from the past 1095 days.     Transthoracic Echo (TTE) Limited    Result Date: 12/29/2023                  [address], [address] [address]            Tel [phone] Fax [phone] TRANSTHORACIC ECHOCARDIOGRAM REPORT  Patient Name:      GODFREY LA ROSLYN Sy Physician:    [id number] Marla Muir MD Study Date:        12/29/2023           Ordering Provider:    [id number] KB MANN MRN/PID:           [id number]             Fellow: Accession#:        [id number]         Nurse: Date of Birth/Age: 2000 / 23 years Sonographer:          Shy Brooks RDMS,                                                               JACQUE, RVS Gender:            F                    Additional Staff: Height:            175.26 cm            Admit Date: Weight:            61.24 kg             Admission Status: BSA:               1.75 m2              Department Location:  [facility] Blood Pressure: 122 /70 mmHg Study Type:    TRANSTHORACIC ECHO (TTE) LIMITED Diagnosis/ICD: Syncope and collapse-R55 Indication:    Syncope,  Chest pain, HLD, Former smoker  Study Detail: The following Echo studies were performed: 2D, M-Mode, Doppler and               color flow.  PHYSICIAN INTERPRETATION: Left Ventricle: Left ventricular systolic function is normal, with an estimated ejection fraction of 65%. There are no regional wall motion abnormalities. The left ventricular cavity size is normal. There is no evidence of left ventricular hypertrophy. Spectral Doppler shows a normal pattern of left ventricular diastolic filling. There is no definite left ventricular thrombus visualized. The intraventricular septum appears intact without evidence of shunting or a ventricular septal defect. Left Atrium: The left atrium is normal in size. There is no atrial septal defect present. Right Ventricle: The right ventricle is normal in size. There is normal right ventricular global systolic function. Right Atrium: The right atrium is normal in size. Aortic Valve: The aortic valve is trileaflet. There is no evidence of aortic valve stenosis. The aortic valve dimensionless index is 0.72. There is no evidence of aortic valve regurgitation. The peak instantaneous gradient of the aortic valve is 7.0 mmHg. The mean gradient of the aortic valve is 4.0 mmHg. Mitral Valve: The mitral valve is normal in structure. There is no evidence of mitral valve stenosis. There is no evidence of mitral annular calcification. There is trace to mild mitral valve regurgitation. Tricuspid Valve: The tricuspid valve is structurally normal. There is no evidence of tricuspid valve stenosis. There is trace tricuspid regurgitation. The Doppler estimated RVSP is within normal limits at 18.5 mmHg. Pulmonic Valve: The pulmonic valve is not well visualized. There is trace pulmonic valve regurgitation. Pericardium: There is no pericardial effusion noted. Aorta: The aortic root is normal. Systemic Veins: The inferior vena cava was not well visualized. In comparison to the previous  echocardiogram(s): There are no prior studies on this patient for comparison purposes.  CONCLUSIONS:  1. Left ventricular systolic function is normal with a 65% estimated ejection fraction.  2. Intact intraventricular septum without shunting or a ventricular septal defect.  3. No left ventricular thrombus visualized.  4. There is no evidence of left ventricular hypertrophy.  5. There is No tricuspid stenosis.  6. RVSP within normal limits.  7. Aortic valve stenosis is not present. QUANTITATIVE DATA SUMMARY: 2D MEASUREMENTS:                          Normal Ranges: Ao Root d:     2.90 cm   (2.0-3.7cm) LAs:           3.00 cm   (2.7-4.0cm) RVIDd:         1.77 cm   (0.9-3.6cm) IVSd:          0.69 cm   (0.6-1.1cm) LVPWd:         0.83 cm   (0.6-1.1cm) LVIDd:         4.72 cm   (3.9-5.9cm) LVIDs:         2.85 cm LV Mass Index: 66.0 g/m2 LV % FS        39.6 % LV SYSTOLIC FUNCTION BY 2D PLANIMETRY (MOD):                     Normal Ranges: EF-A4C View: 67.2 % (>=55%) LV DIASTOLIC FUNCTION:                        Normal Ranges: MV Peak E:    0.97 m/s (0.7-1.2 m/s) MV Peak A:    0.53 m/s (0.42-0.7 m/s) E/A Ratio:    1.82     (1.0-2.2) MV lateral e' 0.20 m/s MV medial e'  0.14 m/s E/e' Ratio:   5.00     (<8.0) MITRAL INSUFFICIENCY:                      Normal Ranges: MR Vmax: 373.00 cm/s AORTIC VALVE:                                   Normal Ranges: AoV Vmax:                1.32 m/s (<=1.7m/s) AoV Peak P.0 mmHg (<20mmHg) AoV Mean P.0 mmHg (1.7-11.5mmHg) LVOT Max Jens:            1.00 m/s (<=1.1m/s) AoV VTI:                 32.10 cm (18-25cm) LVOT VTI:                23.10 cm LVOT Diameter:           1.80 cm  (1.8-2.4cm) AoV Area, VTI:           1.83 cm2 (2.5-5.5cm2) AoV Area,Vmax:           1.93 cm2 (2.5-4.5cm2) AoV Dimensionless Index: 0.72 TRICUSPID VALVE/RVSP:                             Normal Ranges: Peak TR Velocity: 1.97 m/s Est. RA Pressure: 3 mmHg RV Syst Pressure: 18.5 mmHg (< 30mmHg)  PULMONIC VALVE:                      Normal Ranges: PV Max Jens: 0.7 m/s  (0.6-0.9m/s) PV Max P.7 mmHg  [id number] Marla Muir MD Electronically signed on 2023 at 4:46:39 PM  ** Final **    Encounter Date: 23   ECG 12 lead (Clinic Performed)    Narrative    Nsr  No st wave changes      ECG: Normal sinus rhythm.  Normal ECG      Bola Mittal MD

## 2024-02-09 LAB
ATRIAL RATE: 92 BPM
P AXIS: 55 DEGREES
P OFFSET: 206 MS
P ONSET: 163 MS
PR INTERVAL: 116 MS
Q ONSET: 221 MS
QRS COUNT: 15 BEATS
QRS DURATION: 80 MS
QT INTERVAL: 362 MS
QTC CALCULATION(BAZETT): 447 MS
QTC FREDERICIA: 417 MS
R AXIS: 75 DEGREES
T AXIS: 38 DEGREES
T OFFSET: 402 MS
VENTRICULAR RATE: 92 BPM

## 2024-02-23 ENCOUNTER — TELEPHONE (OUTPATIENT)
Dept: CARDIOLOGY | Facility: HOSPITAL | Age: 24
End: 2024-02-23
Payer: COMMERCIAL

## 2024-02-23 LAB — BODY SURFACE AREA: 1.73 M2

## 2024-02-23 NOTE — TELEPHONE ENCOUNTER
I called patient with the results of her Holter monitor.  I explained that she had PACs PVCs very short runs of SVT and episodes second-degree type I Wenke Bach AV block.  I explained what this was and that symptoms that she may or may not feel related to that.  I recommended no further testing unless she has a symptomatic episode of sustained SVT.  I answered all patient questions.  At this point no further testing.    I would like to see patient back in a year or sooner if she develops any different symptoms.    Mary Kate please schedule RTO in 1 year

## 2024-02-27 ENCOUNTER — HOSPITAL ENCOUNTER (EMERGENCY)
Facility: HOSPITAL | Age: 24
Discharge: HOME | End: 2024-02-27
Payer: COMMERCIAL

## 2024-02-27 ENCOUNTER — APPOINTMENT (OUTPATIENT)
Dept: RADIOLOGY | Facility: HOSPITAL | Age: 24
End: 2024-02-27
Payer: COMMERCIAL

## 2024-02-27 ENCOUNTER — APPOINTMENT (OUTPATIENT)
Dept: CARDIOLOGY | Facility: HOSPITAL | Age: 24
End: 2024-02-27
Payer: COMMERCIAL

## 2024-02-27 VITALS
HEART RATE: 62 BPM | TEMPERATURE: 97.2 F | SYSTOLIC BLOOD PRESSURE: 115 MMHG | RESPIRATION RATE: 18 BRPM | OXYGEN SATURATION: 98 % | HEIGHT: 69 IN | DIASTOLIC BLOOD PRESSURE: 83 MMHG | BODY MASS INDEX: 19.55 KG/M2 | WEIGHT: 132 LBS

## 2024-02-27 DIAGNOSIS — D64.9 NORMOCYTIC ANEMIA: ICD-10-CM

## 2024-02-27 DIAGNOSIS — R00.2 PALPITATIONS: ICD-10-CM

## 2024-02-27 DIAGNOSIS — R06.02 SHORTNESS OF BREATH: ICD-10-CM

## 2024-02-27 DIAGNOSIS — R42 LIGHTHEADEDNESS: Primary | ICD-10-CM

## 2024-02-27 LAB
ALBUMIN SERPL BCP-MCNC: 4.5 G/DL (ref 3.4–5)
ALP SERPL-CCNC: 36 U/L (ref 33–110)
ALT SERPL W P-5'-P-CCNC: 6 U/L (ref 7–45)
ANION GAP SERPL CALC-SCNC: 12 MMOL/L (ref 10–20)
APTT PPP: 34 SECONDS (ref 27–38)
AST SERPL W P-5'-P-CCNC: 11 U/L (ref 9–39)
B-HCG SERPL-ACNC: <2 MIU/ML
BASOPHILS # BLD AUTO: 0.05 X10*3/UL (ref 0–0.1)
BASOPHILS NFR BLD AUTO: 0.7 %
BILIRUB SERPL-MCNC: 0.4 MG/DL (ref 0–1.2)
BNP SERPL-MCNC: 26 PG/ML (ref 0–99)
BUN SERPL-MCNC: 10 MG/DL (ref 6–23)
CALCIUM SERPL-MCNC: 9.5 MG/DL (ref 8.6–10.3)
CARDIAC TROPONIN I PNL SERPL HS: <3 NG/L (ref 0–13)
CARDIAC TROPONIN I PNL SERPL HS: <3 NG/L (ref 0–13)
CHLORIDE SERPL-SCNC: 103 MMOL/L (ref 98–107)
CO2 SERPL-SCNC: 26 MMOL/L (ref 21–32)
CREAT SERPL-MCNC: 0.67 MG/DL (ref 0.5–1.05)
EGFRCR SERPLBLD CKD-EPI 2021: >90 ML/MIN/1.73M*2
EOSINOPHIL # BLD AUTO: 0.02 X10*3/UL (ref 0–0.7)
EOSINOPHIL NFR BLD AUTO: 0.3 %
ERYTHROCYTE [DISTWIDTH] IN BLOOD BY AUTOMATED COUNT: 16.1 % (ref 11.5–14.5)
GLUCOSE SERPL-MCNC: 92 MG/DL (ref 74–99)
HCT VFR BLD AUTO: 34.3 % (ref 36–46)
HGB BLD-MCNC: 10.8 G/DL (ref 12–16)
HOLD SPECIMEN: NORMAL
HOLD SPECIMEN: NORMAL
IMM GRANULOCYTES # BLD AUTO: 0.02 X10*3/UL (ref 0–0.7)
IMM GRANULOCYTES NFR BLD AUTO: 0.3 % (ref 0–0.9)
INR PPP: 1.2 (ref 0.9–1.1)
LYMPHOCYTES # BLD AUTO: 2.37 X10*3/UL (ref 1.2–4.8)
LYMPHOCYTES NFR BLD AUTO: 30.9 %
MAGNESIUM SERPL-MCNC: 1.62 MG/DL (ref 1.6–2.4)
MCH RBC QN AUTO: 25.7 PG (ref 26–34)
MCHC RBC AUTO-ENTMCNC: 31.5 G/DL (ref 32–36)
MCV RBC AUTO: 82 FL (ref 80–100)
MONOCYTES # BLD AUTO: 0.49 X10*3/UL (ref 0.1–1)
MONOCYTES NFR BLD AUTO: 6.4 %
NEUTROPHILS # BLD AUTO: 4.71 X10*3/UL (ref 1.2–7.7)
NEUTROPHILS NFR BLD AUTO: 61.4 %
NRBC BLD-RTO: 0 /100 WBCS (ref 0–0)
PLATELET # BLD AUTO: 266 X10*3/UL (ref 150–450)
POTASSIUM SERPL-SCNC: 4.1 MMOL/L (ref 3.5–5.3)
PROT SERPL-MCNC: 7.2 G/DL (ref 6.4–8.2)
PROTHROMBIN TIME: 13.5 SECONDS (ref 9.8–12.8)
RBC # BLD AUTO: 4.21 X10*6/UL (ref 4–5.2)
SODIUM SERPL-SCNC: 137 MMOL/L (ref 136–145)
TSH SERPL-ACNC: 1.45 MIU/L (ref 0.44–3.98)
WBC # BLD AUTO: 7.7 X10*3/UL (ref 4.4–11.3)

## 2024-02-27 PROCEDURE — 2550000001 HC RX 255 CONTRASTS

## 2024-02-27 PROCEDURE — 80053 COMPREHEN METABOLIC PANEL: CPT

## 2024-02-27 PROCEDURE — 84702 CHORIONIC GONADOTROPIN TEST: CPT

## 2024-02-27 PROCEDURE — 93005 ELECTROCARDIOGRAM TRACING: CPT

## 2024-02-27 PROCEDURE — 85610 PROTHROMBIN TIME: CPT

## 2024-02-27 PROCEDURE — 84443 ASSAY THYROID STIM HORMONE: CPT

## 2024-02-27 PROCEDURE — 2500000004 HC RX 250 GENERAL PHARMACY W/ HCPCS (ALT 636 FOR OP/ED)

## 2024-02-27 PROCEDURE — 96360 HYDRATION IV INFUSION INIT: CPT

## 2024-02-27 PROCEDURE — 85025 COMPLETE CBC W/AUTO DIFF WBC: CPT

## 2024-02-27 PROCEDURE — 83880 ASSAY OF NATRIURETIC PEPTIDE: CPT

## 2024-02-27 PROCEDURE — 84484 ASSAY OF TROPONIN QUANT: CPT

## 2024-02-27 PROCEDURE — 71275 CT ANGIOGRAPHY CHEST: CPT

## 2024-02-27 PROCEDURE — 83735 ASSAY OF MAGNESIUM: CPT

## 2024-02-27 PROCEDURE — 71275 CT ANGIOGRAPHY CHEST: CPT | Mod: FOREIGN READ | Performed by: RADIOLOGY

## 2024-02-27 PROCEDURE — 36415 COLL VENOUS BLD VENIPUNCTURE: CPT

## 2024-02-27 PROCEDURE — 99285 EMERGENCY DEPT VISIT HI MDM: CPT | Mod: 25

## 2024-02-27 RX ADMIN — SODIUM CHLORIDE 1000 ML: 9 INJECTION, SOLUTION INTRAVENOUS at 19:54

## 2024-02-27 RX ADMIN — IOHEXOL 75 ML: 350 INJECTION, SOLUTION INTRAVENOUS at 20:54

## 2024-02-27 ASSESSMENT — PAIN - FUNCTIONAL ASSESSMENT: PAIN_FUNCTIONAL_ASSESSMENT: 0-10

## 2024-02-27 ASSESSMENT — COLUMBIA-SUICIDE SEVERITY RATING SCALE - C-SSRS
1. IN THE PAST MONTH, HAVE YOU WISHED YOU WERE DEAD OR WISHED YOU COULD GO TO SLEEP AND NOT WAKE UP?: NO
6. HAVE YOU EVER DONE ANYTHING, STARTED TO DO ANYTHING, OR PREPARED TO DO ANYTHING TO END YOUR LIFE?: NO
2. HAVE YOU ACTUALLY HAD ANY THOUGHTS OF KILLING YOURSELF?: NO

## 2024-02-27 ASSESSMENT — PAIN SCALES - GENERAL: PAINLEVEL_OUTOF10: 0 - NO PAIN

## 2024-02-27 NOTE — ED PROVIDER NOTES
HPI   Chief Complaint   Patient presents with    Dizziness       History provided by: Patient and mother    Limitations to history: None    CC: Lightheadedness, palpitations, shortness of breath    HPI: 24-year-old female presents emergency department to be evaluated with her mother for an episode of lightheadedness, palpitations, and shortness of breath.  Patient states that her symptoms started earlier today while she was at college, states that the room was very hot and she started to feel very lightheaded like she may faint.  Denies dizziness or room spinning sensation.  States that she started having palpitations and felt that her heart was going to beat out of her chest and made her feel very short of breath.  Felt that she could not get a big breath and still intermittently feels this way.  Patient has had intermittent palpitations and shortness of breath for couple of months now, she does follow-up with cardiology where she just had an echo in December and a Holter monitor.  They did call her with the results of the Holter monitor however she did not really understand the results and what they meant.  She states that she still intermittently has pain when she breathes with the shortness of breath and the palpitations and chest pain.  Denies taking anything for it.  Denies any interesting.  Denies personal history of ACS, is never required a stress test.  They are unsure if she has a family history since she is adopted.  Denies history of heart failure and denies history of blood clots, denies recent plane flights or surgeries or use of OCPs per denies history of cancer.  Denies pain or swelling in the extremities.  Denies history of asthma or COPD.  Denies cough, congestion, runny nose, or any other flulike symptoms.  Denies all GI and  complaints.  Denies all other systemic symptoms.  Denies having anxiety or panic attacks, she states what was different between her exacerbations in the last couple months  and today is that it was much more severe.    ROS: Negative unless mentioned in HPI    Social Hx: Former smoker but denies alcohol and drug use.    Medical Hx: Medical history significant for arthritis, anemia.  Allergy list reviewed.  Immunizations up-to-date.    Physical exam:    Constitutional: Patient is well-nourished and well-developed.  Sitting comfortably in the room and in no distress. Appears to be uncomfortable when she breathes. Oriented to person, place, time, and situation.    HEENT: Head is normocephalic, atraumatic. Patient's airway is patent.  Tympanic membranes are clear bilaterally.  Nasal mucosa clear.  Mouth with normal mucosa.  Throat is not erythematous and there are no oropharyngeal exudates, uvula is midline.  No obvious facial deformities.    Eyes: Clear bilaterally.  Pupils are equal round and reactive to light and accommodation.  Extraocular movements intact.      Cardiac: Regular rate, regular rhythm.  Heart sounds S1, S2.  No murmurs, rubs, or gallops.  PMI nondisplaced.  No JVD.    Respiratory: Regular respiratory rate and effort.  Breath sounds are clear and equal bilaterally, no adventitious lung sounds.  Patient is speaking in full sentences and is in no apparent respiratory distress. No use of accessory muscles.      Gastrointestinal: Abdomen is soft, nondistended, and nontender.  There are no obvious deformities.  No rebound tenderness or guarding.  Bowel sounds are normal active.    Genitourinary: No CVA or flank tenderness.    Musculoskeletal: No reproducible tenderness.  No peripheral edema or erythema.  No calf tenderness but negative Homans' sign.  No obvious skin or bony deformities.  Patient has equal range of motion in all extremities and no strength deficiencies.  No muscle or joint tenderness. No back or neck tenderness.  Capillary refill less than 3 seconds.  Strong peripheral pulses.  No sensory deficits.    Neurological: Patient is alert and oriented.  No focal  deficits.  5/5 strength in all extremities.  Cranial nerves II through XII intact. GCS15.     Skin: Skin is normal color for race and is warm, dry, and intact.  No evidence of trauma.  No lesions, rashes, bruising, jaundice, or masses.    Psych: Appropriate mood and affect.  No apparent risk to self or others.    Heme/lymph: No adenopathy, lymphadenopathy, or splenomegaly    Physical exam is otherwise negative unless stated above or in history of present illness.                          No data recorded                   Patient History   Past Medical History:   Diagnosis Date    ADHD (attention deficit hyperactivity disorder) 2006    Allergic     Anemia     Anxiety     Arthritis     Cervicalgia 04/09/2021    Chronic neck and back pain    Depression     Hypertrophy of breast 04/09/2021    Macromastia    Hypertrophy of breast 04/09/2021    Large breasts    Personal history of other diseases of the musculoskeletal system and connective tissue 09/28/2020    History of neck pain    Personal history of other diseases of the respiratory system 10/10/2022    History of upper respiratory infection    Personal history of other mental and behavioral disorders     History of mental disorder    Substance abuse (CMS/[facility])     Visual impairment      Past Surgical History:   Procedure Laterality Date    ADENOIDECTOMY      BREAST SURGERY      OTHER SURGICAL HISTORY  05/13/2020    Tonsillectomy    TONSILLECTOMY      WISDOM TOOTH EXTRACTION       No family history on file.  Social History     Tobacco Use    Smoking status: Former     Types: Cigarettes    Smokeless tobacco: Never   Substance Use Topics    Alcohol use: Not Currently    Drug use: Not Currently     Comment: uses cbd but with no thc       Physical Exam   ED Triage Vitals [02/27/24 1608]   Temperature Heart Rate Respirations BP   36.2 °C (97.2 °F) 75 20 148/84      Pulse Ox Temp Source Heart Rate Source Patient Position   98 % Temporal -- --      BP Location FiO2 (%)      Right arm --       Physical Exam    ED Course & MDM      Patient updated on plan for lab testing, IV insertion, radiology imaging, and medications to be administered while in the ER (if indicated). Patient updated on expected wait times for testing and results. Patient provided my name and told to ask any staff member for questions or concerns if they should arise. Electronic medical record reviewed.     MDM    Upon initial assessment, patient was healthy non-toxic appearing and in no apparent distress.  He does appear to be uncomfortable and she takes big breath.    Patient presented to the emergency department with the chief complaint of lightheadedness with palpitations, shortness of breath, and pain when she breathes.  Her breath sounds are clear and equal bilaterally, 90% on room air.  No muffled heart sounds, JVD, murmur.  No peripheral edema or erythema.  On arrival to the emergency department, vital signs were within normal limits    I did review the patient's previous results.  Her echocardiogram was done in December and showed no acute abnormalities including valve regurgitation or stenosis and no atrial or ventricular dysfunction.  I did also review the results from her Holter monitor which showed occasional PACs and PVCs as well as a brief episode of SVT and a brief episode of Mobitz 2 second-degree AV block.    Will get basic blood work, EKG and troponin, thyroid function, pregnancy test, coagulation screen and magnesium, and then will get a CT chest angiography given that she is having persistent pain when she breathes.  I will get a D-dimer however do not believe it will be accurate given that she has had this pain intermittently for a while now.    Patient is feeling well and would like to go home.  Her vital signs are stable.  Updated her and her mother on results but original repeat troponin within normal limits making ACS unlikely.  EKG was performed at 1950 interpreted by me.  Normal sinus  rhythm 66 beats minute.  No ST elevation or depression.  No prolonged QT.  Patient's pregnancy test is negative.  Magnesium is 1.62.  BNP is 26.  CBC shows a normocytic anemia similar to baseline.  CMP shows no acute abnormalities.  Thyroid function shows no hypothyroidism.  CTA reveals no PE, pulm edema, pleural effusion, pericarditis.  I did discuss differentials including the patient going into episodes of SVT, she feels well and like to go home.  I did review her cardiologist note with at this time they would like to withhold on any chronic medications given risk for side effects.  Able to do the 14-day monitor to see how frequent these episodes occur before starting her on any medications.  She will follow-up with her PCP and with her cardiologist.  I discussed supportive treatment.  All questions and concerns addressed.  Reasons to return to ER discussed.  Patient verbalized understanding and agreement with the treatment plan and they remained hemodynamically stable in the ER.    This note was dictated using a speech recognition program.  While an attempt was made at proof-reading to minimize errors, minor errors in transcription may be present    Medical Decision Making      Procedure  Procedures     Ru Barahona PA-C  02/27/24 4429

## 2024-03-02 LAB
ATRIAL RATE: 66 BPM
P AXIS: 74 DEGREES
P OFFSET: 191 MS
P ONSET: 149 MS
PR INTERVAL: 142 MS
Q ONSET: 220 MS
QRS COUNT: 11 BEATS
QRS DURATION: 82 MS
QT INTERVAL: 404 MS
QTC CALCULATION(BAZETT): 423 MS
QTC FREDERICIA: 417 MS
R AXIS: 84 DEGREES
T AXIS: 64 DEGREES
T OFFSET: 422 MS
VENTRICULAR RATE: 66 BPM

## 2024-03-20 ENCOUNTER — OFFICE VISIT (OUTPATIENT)
Dept: PRIMARY CARE | Facility: CLINIC | Age: 24
End: 2024-03-20
Payer: COMMERCIAL

## 2024-03-20 VITALS
TEMPERATURE: 98 F | OXYGEN SATURATION: 96 % | DIASTOLIC BLOOD PRESSURE: 84 MMHG | HEART RATE: 108 BPM | WEIGHT: 140 LBS | RESPIRATION RATE: 16 BRPM | BODY MASS INDEX: 20.73 KG/M2 | HEIGHT: 69 IN | SYSTOLIC BLOOD PRESSURE: 120 MMHG

## 2024-03-20 DIAGNOSIS — F50.01 ANOREXIA NERVOSA, RESTRICTING TYPE (MULTI): Primary | ICD-10-CM

## 2024-03-20 DIAGNOSIS — F25.0 SCHIZOAFFECTIVE DISORDER, BIPOLAR TYPE (MULTI): ICD-10-CM

## 2024-03-20 DIAGNOSIS — E72.59: ICD-10-CM

## 2024-03-20 DIAGNOSIS — K59.09 CHRONIC CONSTIPATION: ICD-10-CM

## 2024-03-20 DIAGNOSIS — R00.2 PALPITATIONS: ICD-10-CM

## 2024-03-20 DIAGNOSIS — F12.20 CANNABIS DEPENDENCE, UNCOMPLICATED (MULTI): ICD-10-CM

## 2024-03-20 DIAGNOSIS — D35.2 PROLACTINOMA (MULTI): ICD-10-CM

## 2024-03-20 DIAGNOSIS — L74.510 AXILLARY HYPERHIDROSIS: ICD-10-CM

## 2024-03-20 PROBLEM — F50.019 ANOREXIA NERVOSA, RESTRICTING TYPE: Status: ACTIVE | Noted: 2024-03-20

## 2024-03-20 PROCEDURE — 99214 OFFICE O/P EST MOD 30 MIN: CPT | Performed by: INTERNAL MEDICINE

## 2024-03-20 PROCEDURE — 1036F TOBACCO NON-USER: CPT | Performed by: INTERNAL MEDICINE

## 2024-03-20 RX ORDER — LINACLOTIDE 290 UG/1
290 CAPSULE, GELATIN COATED ORAL DAILY
Qty: 30 CAPSULE | Refills: 3 | Status: SHIPPED | OUTPATIENT
Start: 2024-03-20

## 2024-03-20 NOTE — PROGRESS NOTES
"Subjective   Ayesha I Ted \"Shelbi\" is a 24 y.o. female who presents for Hospital Follow-up.    [facility] ED eval 2/27/24  Dx: Lightheadedness, Palpitations, SOB  Labs, CTA, EKG  Med changes: None  Follow up: PCP, Cardiologist.  Reports recurrence of episodes several times since ED eval.  Relapsed w/alcohol this week.  Induced additional episode.  Increase in stress.  Cut out caffiene.    Wanting to Increase Linzess.  Reports no BM \"in a couple of weeks.\"  C/o of abdominal firmness, distention.    Stress induces HSV outbreaks.  Wants to discuss Valtrex dose.    Review of Systems   Constitutional:  Negative for fatigue and fever.   Respiratory:  Negative for cough and shortness of breath.    Cardiovascular:  Negative for chest pain and leg swelling.   All other systems reviewed and are negative.      Health Maintenance Due   Topic Date Due    Yearly Adult Physical  Never done    Hepatitis C Screening  Never done    Cervical Cancer Screening  Never done    Influenza Vaccine (1) 09/01/2023    COVID-19 Vaccine (4 - 2023-24 season) 09/01/2023       Objective   /84   Pulse 108   Temp 36.7 °C (98 °F)   Resp 16   Ht 1.753 m (5' 9\")   Wt 63.5 kg (140 lb)   SpO2 96%   BMI 20.67 kg/m²     Physical Exam  Vitals and nursing note reviewed.   Constitutional:       Appearance: Normal appearance.   HENT:      Head: Normocephalic.   Eyes:      Conjunctiva/sclera: Conjunctivae normal.      Pupils: Pupils are equal, round, and reactive to light.   Cardiovascular:      Rate and Rhythm: Normal rate and regular rhythm.      Pulses: Normal pulses.      Heart sounds: Normal heart sounds.   Pulmonary:      Effort: Pulmonary effort is normal.      Breath sounds: Normal breath sounds.   Musculoskeletal:         General: No swelling.      Cervical back: Neck supple.   Skin:     General: Skin is warm and dry.   Neurological:      General: No focal deficit present.      Mental Status: She is oriented to person, place, and time. "         Assessment/Plan   Problem List Items Addressed This Visit       Chronic constipation    Relevant Medications    Linzess 290 mcg capsule    Prolactinoma (CMS/HCC)    Cannabis dependence, uncomplicated (CMS/HCC)    Hyperprolinemia (CMS/HCC)    Schizoaffective disorder, bipolar type (CMS/HCC)    Palpitations    Anorexia nervosa, restricting type - Primary     Other Visit Diagnoses       Axillary hyperhidrosis        Relevant Medications    aluminum chloride (Drysol) 20 % external solution          Cont meds  Reviewed records  Spent extensive time discussing options and etiology of her symtpoms  Offered propanolol or metroprolol prn or scheduled  Will discuss options with her psychiatrist and call back

## 2024-03-21 ASSESSMENT — ENCOUNTER SYMPTOMS
SHORTNESS OF BREATH: 0
FEVER: 0
FATIGUE: 0
COUGH: 0

## 2024-03-27 PROBLEM — G47.9 DISTURBANCE IN SLEEP BEHAVIOR: Status: ACTIVE | Noted: 2024-03-27

## 2024-04-24 ENCOUNTER — OFFICE VISIT (OUTPATIENT)
Dept: PRIMARY CARE | Facility: CLINIC | Age: 24
End: 2024-04-24
Payer: COMMERCIAL

## 2024-04-24 VITALS
WEIGHT: 136 LBS | TEMPERATURE: 97 F | OXYGEN SATURATION: 100 % | BODY MASS INDEX: 20.14 KG/M2 | SYSTOLIC BLOOD PRESSURE: 108 MMHG | RESPIRATION RATE: 16 BRPM | HEART RATE: 72 BPM | HEIGHT: 69 IN | DIASTOLIC BLOOD PRESSURE: 78 MMHG

## 2024-04-24 DIAGNOSIS — B00.1 COLD SORE: ICD-10-CM

## 2024-04-24 DIAGNOSIS — R42 POSTURAL LIGHTHEADEDNESS: ICD-10-CM

## 2024-04-24 DIAGNOSIS — N92.6 IRREGULAR PERIODS: Primary | ICD-10-CM

## 2024-04-24 DIAGNOSIS — F43.10 PTSD (POST-TRAUMATIC STRESS DISORDER): ICD-10-CM

## 2024-04-24 LAB — PREGNANCY TEST URINE, POC: NEGATIVE

## 2024-04-24 PROCEDURE — 99214 OFFICE O/P EST MOD 30 MIN: CPT | Performed by: INTERNAL MEDICINE

## 2024-04-24 PROCEDURE — 81025 URINE PREGNANCY TEST: CPT | Performed by: INTERNAL MEDICINE

## 2024-04-24 RX ORDER — VALACYCLOVIR HYDROCHLORIDE 500 MG/1
500 TABLET, FILM COATED ORAL DAILY
Qty: 90 TABLET | Refills: 0 | Status: SHIPPED | OUTPATIENT
Start: 2024-04-24

## 2024-04-24 RX ORDER — DEXMETHYLPHENIDATE HYDROCHLORIDE 10 MG/1
TABLET ORAL
COMMUNITY
Start: 2024-04-17

## 2024-04-24 NOTE — PROGRESS NOTES
"Subjective   Ayesha I Ted \"Shelbi\" is a 24 y.o. female who presents for Follow-up.    HPI   Pt reports started menstrual cycle on Sunday.  Spotting initially.  Heavy over past day. Bright red, little clots.  Changing products frequently.  Has IUD 2017, per [facility].  Usual cycle light.  Some nausea, lightheadedness today.  LMP x2 weeks ago, normal.    Reports continued \"heart problems\".  Managing, has follow up w/Cardiology.  Does not want to start beta blocker.  Concerned about weight gain      Review of Systems   Constitutional:  Negative for fatigue and fever.   Respiratory:  Negative for cough and shortness of breath.    Cardiovascular:  Positive for palpitations. Negative for chest pain and leg swelling.   All other systems reviewed and are negative.      Health Maintenance Due   Topic Date Due    Yearly Adult Physical  Never done    Hepatitis C Screening  Never done    Cervical Cancer Screening  Never done    COVID-19 Vaccine (4 - 2023-24 season) 09/01/2023       Objective   /78   Pulse 72   Temp 36.1 °C (97 °F)   Resp 16   Ht 1.753 m (5' 9\")   Wt 61.7 kg (136 lb)   SpO2 100%   BMI 20.08 kg/m²     Physical Exam  Vitals and nursing note reviewed.   Constitutional:       Appearance: Normal appearance.   HENT:      Head: Normocephalic.   Eyes:      Conjunctiva/sclera: Conjunctivae normal.      Pupils: Pupils are equal, round, and reactive to light.   Cardiovascular:      Rate and Rhythm: Normal rate and regular rhythm.      Pulses: Normal pulses.      Heart sounds: Normal heart sounds.   Pulmonary:      Effort: Pulmonary effort is normal.      Breath sounds: Normal breath sounds.   Musculoskeletal:         General: No swelling.      Cervical back: Neck supple.   Skin:     General: Skin is warm and dry.   Neurological:      General: No focal deficit present.      Mental Status: She is oriented to person, place, and time.         Assessment/Plan   Problem List Items Addressed This Visit       " PTSD (post-traumatic stress disorder)    Postural lightheadedness     Other Visit Diagnoses       Irregular periods    -  Primary    Relevant Orders    POCT Pregnancy, Urine manually resulted (Completed)    Referral to Gynecology    US pelvis transvaginal    Cold sore        Relevant Medications    valACYclovir (Valtrex) 500 mg tablet          Discussed pt symptoms  Reassurance  Pt has plan to move at end of summer  Stable based on symptoms and exam.  Continue established treatment plan and follow up at least yearly.  Fu in 2-3 months

## 2024-04-25 ASSESSMENT — ENCOUNTER SYMPTOMS
PALPITATIONS: 1
FEVER: 0
COUGH: 0
FATIGUE: 0
SHORTNESS OF BREATH: 0

## 2024-05-01 ENCOUNTER — OFFICE VISIT (OUTPATIENT)
Dept: CARDIOLOGY | Facility: CLINIC | Age: 24
End: 2024-05-01
Payer: COMMERCIAL

## 2024-05-01 VITALS
BODY MASS INDEX: 20.14 KG/M2 | DIASTOLIC BLOOD PRESSURE: 67 MMHG | WEIGHT: 136 LBS | OXYGEN SATURATION: 97 % | HEIGHT: 69 IN | HEART RATE: 62 BPM | SYSTOLIC BLOOD PRESSURE: 97 MMHG

## 2024-05-01 DIAGNOSIS — I47.10 SVT (SUPRAVENTRICULAR TACHYCARDIA) (CMS-HCC): Primary | ICD-10-CM

## 2024-05-01 DIAGNOSIS — I44.1 WENCKEBACH SECOND DEGREE AV BLOCK: ICD-10-CM

## 2024-05-01 PROCEDURE — 1036F TOBACCO NON-USER: CPT | Performed by: INTERNAL MEDICINE

## 2024-05-01 PROCEDURE — 99214 OFFICE O/P EST MOD 30 MIN: CPT | Performed by: INTERNAL MEDICINE

## 2024-05-01 RX ORDER — VERAPAMIL HYDROCHLORIDE 120 MG/1
120 TABLET, FILM COATED, EXTENDED RELEASE ORAL NIGHTLY
Qty: 30 TABLET | Refills: 11 | Status: SHIPPED | OUTPATIENT
Start: 2024-05-01 | End: 2025-05-01

## 2024-05-01 ASSESSMENT — PAIN SCALES - GENERAL: PAINLEVEL: 0-NO PAIN

## 2024-05-01 NOTE — PROGRESS NOTES
Name : Ayesha Jean   : 2000   MRN : [id number]   ENC Date : 2024      Assessment and Plan:  Supraventricular tachycardia: Patient had 1 significant event that probably had 2 separate episodes in the same day earlier this year prompting an emergency room visit.  By the time she was evaluated in the emergency room she had converted to normal rhythm.  She probably had 2 separate episodes 1 while in class and another 1 while sitting in the waiting room.  We had a long discussion today regarding SVT.  She has found that caffeine can increase her frequency of symptoms so she has cut back on this.  Ultimately we discussed medication therapy watch and wait approach and referral for EP.  I do not think her arrhythmia burden is high enough nor have any documented enough SVT to recommend ablation at this point.  A trial of low-dose calcium channel blocker would be reasonable.  Verapamil is not particularly potent antihypertensive so she probably can try try it safely.  She asked me to prescribe the medication but she is not certain if she will try it or not.  I told her that it would not be an as needed medication she should take it on a regular basis to try to prevent the SVT from occurring.  I encouraged her to purchase the [facility] mobile device or even better would be a smart watch with EKG tracing capability so we can get a better sense as to what her actual heart rhythm is when she feels her symptoms.  Disp: RTO in 1 year or sooner if needed.    HPI:  Patient returns today after being in the emergency room in late February.  She had an episode of tachycardia that occurred while in class.  This prompted her to have tachypnea as well.  She was ultimately taken to the emergency room.  She was feeling well in the waiting room and then had another spell witnessed by her mother.  By the time she was evaluated in the emergency room she was in sinus rhythm.  Since late February her symptoms of actually improved.  She  has found that caffeine intake does seem to increase the frequency of her symptoms so she has tried cutting back on that.    Problem list overview:   Patient Active Problem List   Diagnosis    Attention deficit hyperactivity disorder (ADHD)    Bipolar depression (Multi)    Chronic constipation    Generalized headaches    Hypothyroidism    PCOS (polycystic ovarian syndrome)    Prolactinoma (Multi)    PTSD (post-traumatic stress disorder)    Status post bilateral breast reduction    Osteoarthritis of spine with radiculopathy, cervical region    Anxiety    Bipolar I disorder, severe, current or most recent episode depressed, with psychotic features (Multi)    Borderline personality disorder (Multi)    Cannabis abuse    Cannabis dependence, uncomplicated (Multi)    Cocaine dependence, uncomplicated (Multi)    Hyperprolactinemia (Multi)    Hyperprolinemia (Multi)    PMDD (premenstrual dysphoric disorder)    Overdose of tricyclic antidepressants    Obesity, Class I, BMI 30-34.9    MDD (major depressive disorder)    Suicide attempt (Multi)    ADHD (attention deficit hyperactivity disorder), combined type    Schizoaffective disorder, bipolar type (Multi)    Erosion of teeth due to medicine    Hypophosphatasia    Postural lightheadedness    Rash    Tremor    Vasovagal syncope    Palpitations    Anorexia nervosa, restricting type (Multi)    Disturbance in sleep behavior       Meds:   Current Outpatient Medications on File Prior to Visit   Medication Sig Dispense Refill    aluminum chloride (Drysol) 20 % external solution Apply topically once daily at bedtime. 60 mL 11    asenapine (Saphris) SL tablet Dissolve 1 tablet under the tongue every morning. And dissolve 2 tablets at bedtime      Belsomra 10 mg tablet Take 1 tablet (10 mg) by mouth once daily at bedtime.      cabergoline (Dostinex) 0.5 mg tablet Take 1 tablet (0.5 mg) by mouth 5 times a week.      cetirizine (ZyrTEC) 10 mg tablet Take 1 tablet (10 mg) by mouth once  "daily. 30 tablet 3    clonazePAM (KlonoPIN) 2 mg tablet Take 1 tablet (2 mg) by mouth as needed at bedtime for anxiety.      cloNIDine (Catapres) 0.2 mg tablet Take 1 tablet (0.2 mg) by mouth once daily at bedtime.      dexmethylphenidate (Focalin) 10 mg tablet TAKE 1 TABLET BY MOUTH THREE TIMES DAILY FOR 30 DAYS      ergocalciferol (Vitamin D-2) 1.25 MG (63835 UT) capsule Take 1 capsule (1,250 mcg) by mouth 1 (one) time per week.      escitalopram (Lexapro) 10 mg tablet Take 1 tablet by mouth once daily. in addition to to 20mg daily      escitalopram (Lexapro) 20 mg tablet Take 1 tablet by mouth once daily. in addition to 10 mg daily      fluticasone (Flonase) 50 mcg/actuation nasal spray Administer 2 sprays into affected nostril(s) once daily.      lidocaine (Lidoderm) 5 % patch Place 1 patch on the skin once daily. Remove & discard patch within 12 hours or as directed by MD.      Linzess 290 mcg capsule Take 1 capsule (290 mcg) by mouth once daily. 30 capsule 3    metFORMIN XR (Glucophage-XR) 750 mg 24 hr tablet Take 1 tablet (750 mg) by mouth 2 times a day.      multivit-min/ferrous fumarate (MULTI VITAMIN ORAL) Take by mouth.      naproxen (Naprosyn) 500 mg tablet TAKE 1 TABLET BY MOUTH TWICE DAILY AS NEEDED FOR PAIN WITH FOOD      polyethylene glycol (Glycolax) 17 gram packet Take 17 g by mouth 2 times a day as needed.      spironolactone (Aldactone) 50 mg tablet Take 1 tablet (50 mg) by mouth 2 times a day.      valACYclovir (Valtrex) 500 mg tablet Take 1 tablet (500 mg) by mouth once daily. 90 tablet 0     No current facility-administered medications on file prior to visit.        VS:  BP 97/67 (BP Location: Right arm, Patient Position: Sitting)   Pulse 62   Ht 1.753 m (5' 9\")   Wt 61.7 kg (136 lb)   SpO2 97%   BMI 20.08 kg/m²     Vitals reviewed.   Neck:      Vascular: No JVD.   Pulmonary:      Effort: Pulmonary effort is normal.      Breath sounds: Normal breath sounds.   Cardiovascular:      Normal " rate. Regular rhythm.      Murmurs: There is no murmur.      No gallop.    Pulses:     Intact distal pulses.   Edema:     Peripheral edema absent.   Abdominal:      General: Abdomen is flat.      Palpations: Abdomen is soft.   Neurological:      General: No focal deficit present.      Mental Status: Alert.   Psychiatric:         Mood and Affect: Mood normal.         Bola Mittal MD

## 2024-05-02 ENCOUNTER — HOSPITAL ENCOUNTER (OUTPATIENT)
Dept: RADIOLOGY | Facility: HOSPITAL | Age: 24
Discharge: HOME | End: 2024-05-02
Payer: COMMERCIAL

## 2024-05-02 DIAGNOSIS — N92.6 IRREGULAR PERIODS: ICD-10-CM

## 2024-05-02 PROCEDURE — 76830 TRANSVAGINAL US NON-OB: CPT

## 2024-05-08 ENCOUNTER — TELEPHONE (OUTPATIENT)
Dept: PRIMARY CARE | Facility: CLINIC | Age: 24
End: 2024-05-08
Payer: COMMERCIAL

## 2024-05-08 NOTE — TELEPHONE ENCOUNTER
----- Message from Jacquelin Moy DO sent at 5/8/2024  1:09 PM EDT -----  Call patient her pelvic us looks good  They mention the iud is low lying  Want her to see gyn to double check placement   Does she have someone to see

## 2024-05-08 NOTE — RESULT ENCOUNTER NOTE
Call patient her pelvic us looks good  They mention the iud is low lying  Want her to see gyn to double check placement   Does she have someone to see

## 2024-06-18 ENCOUNTER — TELEPHONE (OUTPATIENT)
Dept: CARDIOLOGY | Facility: CLINIC | Age: 24
End: 2024-06-18
Payer: COMMERCIAL

## 2024-06-18 DIAGNOSIS — I47.10 SVT (SUPRAVENTRICULAR TACHYCARDIA) (CMS-HCC): ICD-10-CM

## 2024-06-18 NOTE — TELEPHONE ENCOUNTER
Pt mother called stating that Ayesha thought about heart rhythm prescription and would like to be put on one.  She could not remember which one was discussed in office day of appointment but if I could reach out to Dr Mittal and let him know her decision.

## 2024-06-19 RX ORDER — VERAPAMIL HYDROCHLORIDE 120 MG/1
120 TABLET, FILM COATED, EXTENDED RELEASE ORAL NIGHTLY
Qty: 90 TABLET | Refills: 3 | Status: SHIPPED | OUTPATIENT
Start: 2024-06-19 | End: 2025-06-19

## 2024-06-24 ENCOUNTER — APPOINTMENT (OUTPATIENT)
Dept: OBSTETRICS AND GYNECOLOGY | Facility: CLINIC | Age: 24
End: 2024-06-24
Payer: COMMERCIAL

## 2024-07-24 ENCOUNTER — APPOINTMENT (OUTPATIENT)
Dept: PRIMARY CARE | Facility: CLINIC | Age: 24
End: 2024-07-24
Payer: COMMERCIAL

## 2024-07-30 DIAGNOSIS — K59.09 CHRONIC CONSTIPATION: ICD-10-CM

## 2024-08-27 ENCOUNTER — TELEPHONE (OUTPATIENT)
Dept: SLEEP MEDICINE | Facility: HOSPITAL | Age: 24
End: 2024-08-27
Payer: COMMERCIAL

## 2024-08-27 DIAGNOSIS — G47.30 SLEEP DISORDER BREATHING: Primary | ICD-10-CM

## 2024-09-09 ENCOUNTER — APPOINTMENT (OUTPATIENT)
Dept: SLEEP MEDICINE | Facility: CLINIC | Age: 24
End: 2024-09-09
Payer: COMMERCIAL

## 2024-10-15 ENCOUNTER — APPOINTMENT (OUTPATIENT)
Dept: PRIMARY CARE | Facility: CLINIC | Age: 24
End: 2024-10-15
Payer: COMMERCIAL

## 2024-10-15 VITALS
HEIGHT: 69 IN | OXYGEN SATURATION: 100 % | BODY MASS INDEX: 22.66 KG/M2 | SYSTOLIC BLOOD PRESSURE: 122 MMHG | WEIGHT: 153 LBS | DIASTOLIC BLOOD PRESSURE: 80 MMHG | RESPIRATION RATE: 16 BRPM | TEMPERATURE: 97.6 F | HEART RATE: 80 BPM

## 2024-10-15 DIAGNOSIS — Z00.00 HEALTHCARE MAINTENANCE: ICD-10-CM

## 2024-10-15 DIAGNOSIS — F31.9 BIPOLAR DEPRESSION (MULTI): ICD-10-CM

## 2024-10-15 DIAGNOSIS — B00.1 COLD SORE: ICD-10-CM

## 2024-10-15 DIAGNOSIS — F50.011 MODERATE RESTRICTING TYPE ANOREXIA NERVOSA: ICD-10-CM

## 2024-10-15 DIAGNOSIS — G47.9 DISTURBANCE IN SLEEP BEHAVIOR: ICD-10-CM

## 2024-10-15 DIAGNOSIS — B00.1 HERPES LABIALIS: Primary | ICD-10-CM

## 2024-10-15 PROBLEM — R21 RASH: Status: RESOLVED | Noted: 2024-01-31 | Resolved: 2024-10-15

## 2024-10-15 PROBLEM — R00.2 PALPITATIONS: Status: RESOLVED | Noted: 2024-01-31 | Resolved: 2024-10-15

## 2024-10-15 PROCEDURE — 99214 OFFICE O/P EST MOD 30 MIN: CPT | Performed by: INTERNAL MEDICINE

## 2024-10-15 PROCEDURE — 3008F BODY MASS INDEX DOCD: CPT | Performed by: INTERNAL MEDICINE

## 2024-10-15 RX ORDER — VALACYCLOVIR HYDROCHLORIDE 500 MG/1
1000 TABLET, FILM COATED ORAL DAILY
Qty: 60 TABLET | Refills: 2 | Status: SHIPPED | OUTPATIENT
Start: 2024-10-15 | End: 2025-01-13

## 2024-10-15 RX ORDER — DEXMETHYLPHENIDATE HYDROCHLORIDE 40 MG/1
1 CAPSULE, EXTENDED RELEASE ORAL
COMMUNITY
Start: 2024-09-18

## 2024-10-15 RX ORDER — CLONIDINE HYDROCHLORIDE 0.3 MG/1
0.3 TABLET ORAL NIGHTLY
COMMUNITY
Start: 2024-09-18

## 2024-10-15 NOTE — PROGRESS NOTES
"Subjective   Ayesha I Ted \"Shelbi\" is a 24 y.o. female who presents for Mouth Lesions.    HPI   Pt reports multiple cold sore exacerbations throughout year.  Increase in stress at home.  Valtrex not as effective.    July outbreak   October outbreak      Review of Systems   Constitutional:  Negative for fatigue and fever.   Respiratory:  Negative for cough and shortness of breath.    Cardiovascular:  Negative for chest pain and leg swelling.   All other systems reviewed and are negative.      Health Maintenance Due   Topic Date Due    Yearly Adult Physical  Never done    Hepatitis C Screening  Never done    Cervical Cancer Screening  Never done    Influenza Vaccine (1) 09/01/2024    COVID-19 Vaccine (4 - 2024-25 season) 09/01/2024       Objective   /80   Pulse 80   Temp 36.4 °C (97.6 °F)   Resp 16   Ht 1.753 m (5' 9\")   Wt 69.4 kg (153 lb)   SpO2 100%   BMI 22.59 kg/m²     Physical Exam  Vitals and nursing note reviewed.   Constitutional:       Appearance: Normal appearance.   HENT:      Head: Normocephalic.   Eyes:      Conjunctiva/sclera: Conjunctivae normal.      Pupils: Pupils are equal, round, and reactive to light.   Cardiovascular:      Rate and Rhythm: Normal rate and regular rhythm.      Pulses: Normal pulses.      Heart sounds: Normal heart sounds.   Pulmonary:      Effort: Pulmonary effort is normal.      Breath sounds: Normal breath sounds.   Musculoskeletal:         General: No swelling.      Cervical back: Neck supple.   Skin:     General: Skin is warm and dry.   Neurological:      General: No focal deficit present.      Mental Status: She is oriented to person, place, and time.         Assessment/Plan   Problem List Items Addressed This Visit       Bipolar depression (Multi)    Anorexia nervosa, restricting type    Disturbance in sleep behavior     Other Visit Diagnoses       Herpes labialis    -  Primary    Cold sore        Relevant Medications    valACYclovir (Valtrex) 500 mg tablet    " Healthcare maintenance        Relevant Orders    Referral to Gynecology        Answered questions at length  Will increase valtrex x 1 -2 months then resume 1 daily  Pt having 2 outbreaks since July  I reassured her this isnt very abn due to her stress  Will consider hiv testing and immune testing if worsen   Stable based on symptoms and exam.  Continue established treatment plan and follow up at least yearly.

## 2024-10-16 ASSESSMENT — ENCOUNTER SYMPTOMS
FATIGUE: 0
SHORTNESS OF BREATH: 0
COUGH: 0
FEVER: 0

## 2024-10-18 ENCOUNTER — APPOINTMENT (OUTPATIENT)
Dept: OBSTETRICS AND GYNECOLOGY | Facility: CLINIC | Age: 24
End: 2024-10-18
Payer: COMMERCIAL

## 2024-11-08 ENCOUNTER — TELEPHONE (OUTPATIENT)
Dept: PRIMARY CARE | Facility: CLINIC | Age: 24
End: 2024-11-08
Payer: COMMERCIAL

## 2024-11-08 NOTE — TELEPHONE ENCOUNTER
Patient had outbreak and you treated, now she has impetigo.  States this happened prior.  Can the same cream be called in?  Please advise?

## 2024-11-13 ENCOUNTER — OFFICE VISIT (OUTPATIENT)
Dept: OBSTETRICS AND GYNECOLOGY | Facility: CLINIC | Age: 24
End: 2024-11-13
Payer: COMMERCIAL

## 2024-11-13 ENCOUNTER — PREP FOR PROCEDURE (OUTPATIENT)
Dept: OBSTETRICS AND GYNECOLOGY | Facility: CLINIC | Age: 24
End: 2024-11-13

## 2024-11-13 VITALS
HEIGHT: 70 IN | SYSTOLIC BLOOD PRESSURE: 104 MMHG | BODY MASS INDEX: 22.54 KG/M2 | DIASTOLIC BLOOD PRESSURE: 60 MMHG | WEIGHT: 157.4 LBS

## 2024-11-13 DIAGNOSIS — N93.9 ABNORMAL UTERINE BLEEDING (AUB): ICD-10-CM

## 2024-11-13 DIAGNOSIS — D50.0 IRON DEFICIENCY ANEMIA DUE TO CHRONIC BLOOD LOSS: Primary | ICD-10-CM

## 2024-11-13 DIAGNOSIS — Z11.3 SCREEN FOR STD (SEXUALLY TRANSMITTED DISEASE): ICD-10-CM

## 2024-11-13 DIAGNOSIS — L74.510 AXILLARY HYPERHIDROSIS: ICD-10-CM

## 2024-11-13 DIAGNOSIS — N93.9 ABNORMAL UTERINE BLEEDING (AUB): Primary | ICD-10-CM

## 2024-11-13 LAB — PREGNANCY TEST URINE, POC: NEGATIVE

## 2024-11-13 PROCEDURE — 87491 CHLMYD TRACH DNA AMP PROBE: CPT

## 2024-11-13 PROCEDURE — 1036F TOBACCO NON-USER: CPT | Performed by: OBSTETRICS & GYNECOLOGY

## 2024-11-13 PROCEDURE — 87661 TRICHOMONAS VAGINALIS AMPLIF: CPT

## 2024-11-13 PROCEDURE — 87591 N.GONORRHOEAE DNA AMP PROB: CPT

## 2024-11-13 PROCEDURE — 81025 URINE PREGNANCY TEST: CPT | Performed by: OBSTETRICS & GYNECOLOGY

## 2024-11-13 PROCEDURE — 3008F BODY MASS INDEX DOCD: CPT | Performed by: OBSTETRICS & GYNECOLOGY

## 2024-11-13 PROCEDURE — 99205 OFFICE O/P NEW HI 60 MIN: CPT | Performed by: OBSTETRICS & GYNECOLOGY

## 2024-11-13 RX ORDER — ALBUTEROL SULFATE 0.83 MG/ML
3 SOLUTION RESPIRATORY (INHALATION) AS NEEDED
OUTPATIENT
Start: 2024-11-13

## 2024-11-13 RX ORDER — DIPHENHYDRAMINE HYDROCHLORIDE 50 MG/ML
50 INJECTION INTRAMUSCULAR; INTRAVENOUS AS NEEDED
OUTPATIENT
Start: 2024-11-13

## 2024-11-13 RX ORDER — EPINEPHRINE 0.3 MG/.3ML
0.3 INJECTION SUBCUTANEOUS EVERY 5 MIN PRN
OUTPATIENT
Start: 2024-11-13

## 2024-11-13 RX ORDER — TRANEXAMIC ACID 650 MG/1
1300 TABLET ORAL 3 TIMES DAILY
Qty: 90 TABLET | Refills: 3 | Status: SHIPPED | OUTPATIENT
Start: 2024-11-13 | End: 2024-11-18

## 2024-11-13 RX ORDER — FAMOTIDINE 10 MG/ML
20 INJECTION INTRAVENOUS ONCE AS NEEDED
OUTPATIENT
Start: 2024-11-13

## 2024-11-13 ASSESSMENT — ENCOUNTER SYMPTOMS
EYES NEGATIVE: 0
MUSCULOSKELETAL NEGATIVE: 0
CARDIOVASCULAR NEGATIVE: 0
ALLERGIC/IMMUNOLOGIC NEGATIVE: 0
RESPIRATORY NEGATIVE: 0
NEUROLOGICAL NEGATIVE: 0
GASTROINTESTINAL NEGATIVE: 0
CONSTITUTIONAL NEGATIVE: 0
PSYCHIATRIC NEGATIVE: 0
OCCASIONAL FEELINGS OF UNSTEADINESS: 0
DEPRESSION: 0
LOSS OF SENSATION IN FEET: 0
ENDOCRINE NEGATIVE: 0
HEMATOLOGIC/LYMPHATIC NEGATIVE: 0

## 2024-11-13 NOTE — PROGRESS NOTES
GYN PROGRESS NOTE          Chief complaint: er consult    HPI:  Patient answers are not available for this visit.  HPI       Procedure     Additional comments: New patient visit    Chaperone: Corinne Young CMA             Comments    Paragard IUD removal  Was bleeding bright red blood- heavy bleeding, fatigue, nausea, couldn't get out of bed, headache, dizzy. Still having nausea, other symptoms have subsided. Bleeding started 11/6, stopped 11/9. Spotting for a couple of days after.   Seen in ER on 11/10. Was also told that she has anemia. Negative pregnancy test.          Last edited by Corinne Young CMA on 11/13/2024  8:25 AM.          No pictures available for ultrasound reports lower uterine segment IUD report normal location for IUD unless expelled or intracervical. Not  Causing pain    Has had heavy bleeding recently reviewed anemia at hemoglobin of 10    Recommend IV iron over oral iron as polypharmacy and low compliance rates with oral medications    Has had ParaGard for 7 years unclear of pattern patient poor historian    If acute bleed then need to identify change in pattern consider prolactinoma and reevaluation            ROS:  GEN - no fevers or chills  RESP - no SOB or cough  GYN - see HPI      HISTORY:  Past Medical History:   Diagnosis Date    ADHD (attention deficit hyperactivity disorder) 2006    Allergic     Anemia     Anxiety     Arthritis     Cervicalgia 04/09/2021    Chronic neck and back pain    Depression     Hypertrophy of breast 04/09/2021    Macromastia    Hypertrophy of breast 04/09/2021    Large breasts    Personal history of other diseases of the musculoskeletal system and connective tissue 09/28/2020    History of neck pain    Personal history of other diseases of the respiratory system 10/10/2022    History of upper respiratory infection    Personal history of other mental and behavioral disorders     History of mental disorder    Substance abuse (Multi)     Visual impairment   "    Past Surgical History:   Procedure Laterality Date    ADENOIDECTOMY      BREAST SURGERY      OTHER SURGICAL HISTORY  05/13/2020    Tonsillectomy    TONSILLECTOMY      WISDOM TOOTH EXTRACTION       Social History     Socioeconomic History    Marital status: Single     Spouse name: Not on file    Number of children: Not on file    Years of education: Not on file    Highest education level: Not on file   Occupational History    Not on file   Tobacco Use    Smoking status: Former     Current packs/day: 0.00     Types: Cigarettes    Smokeless tobacco: Never   Vaping Use    Vaping status: Never Used   Substance and Sexual Activity    Alcohol use: Not Currently    Drug use: Yes     Types: Marijuana     Comment: uses cbd but with no thc    Sexual activity: Yes     Partners: Male     Birth control/protection: I.U.D.   Other Topics Concern    Not on file   Social History Narrative    Not on file     Social Drivers of Health     Financial Resource Strain: Not on file   Food Insecurity: Not on file   Transportation Needs: Not on file   Physical Activity: Not on file   Stress: Not on file   Social Connections: Not on file   Intimate Partner Violence: Not on file   Housing Stability: Not on file     Cancer-related family history is not on file. She was adopted.       PHYSICAL EXAM:  /60 (BP Location: Right arm, Patient Position: Sitting, BP Cuff Size: Adult)   Ht 1.772 m (5' 9.75\")   Wt 71.4 kg (157 lb 6.4 oz)   LMP 11/06/2024   BMI 22.75 kg/m²   GEN:  A&O, NAD  HEENT:  head HC/AT, no visible goiter  PSYCH:  normal affect, non-anxious      IMPRESSION/PLAN:    24-year-old ParaGard IUD in place for 7 years new onset heavy bleeding history of micro prolactinoma    TXA as needed  IV iron  Follow-up for routine GYN care  Reevaluation with endocrinology regarding stability of prolactinoma  Consider hormonal IUD- patient is concerned about systemic hormonal effects        Girish Rodriguez MD      "

## 2024-11-14 LAB
C TRACH RRNA SPEC QL NAA+PROBE: NEGATIVE
N GONORRHOEA DNA SPEC QL PROBE+SIG AMP: NEGATIVE
T VAGINALIS RRNA SPEC QL NAA+PROBE: NEGATIVE

## 2024-11-18 ENCOUNTER — APPOINTMENT (OUTPATIENT)
Dept: HEMATOLOGY/ONCOLOGY | Facility: CLINIC | Age: 24
End: 2024-11-18
Payer: COMMERCIAL

## 2024-11-19 ENCOUNTER — APPOINTMENT (OUTPATIENT)
Dept: OBSTETRICS AND GYNECOLOGY | Facility: CLINIC | Age: 24
End: 2024-11-19
Payer: COMMERCIAL

## 2024-11-22 ENCOUNTER — TELEPHONE (OUTPATIENT)
Dept: OBSTETRICS AND GYNECOLOGY | Facility: CLINIC | Age: 24
End: 2024-11-22

## 2024-12-11 ENCOUNTER — APPOINTMENT (OUTPATIENT)
Dept: OBSTETRICS AND GYNECOLOGY | Facility: CLINIC | Age: 24
End: 2024-12-11
Payer: COMMERCIAL

## 2025-01-05 ENCOUNTER — PROCEDURE VISIT (OUTPATIENT)
Dept: SLEEP MEDICINE | Facility: HOSPITAL | Age: 25
End: 2025-01-05
Payer: COMMERCIAL

## 2025-01-05 DIAGNOSIS — G47.30 SLEEP DISORDER BREATHING: ICD-10-CM

## 2025-01-05 PROCEDURE — 95806 SLEEP STUDY UNATT&RESP EFFT: CPT | Performed by: STUDENT IN AN ORGANIZED HEALTH CARE EDUCATION/TRAINING PROGRAM

## 2025-01-15 ENCOUNTER — APPOINTMENT (OUTPATIENT)
Dept: OBSTETRICS AND GYNECOLOGY | Facility: CLINIC | Age: 25
End: 2025-01-15
Payer: COMMERCIAL

## 2025-01-15 VITALS
BODY MASS INDEX: 23.28 KG/M2 | HEIGHT: 70 IN | WEIGHT: 162.6 LBS | SYSTOLIC BLOOD PRESSURE: 110 MMHG | DIASTOLIC BLOOD PRESSURE: 68 MMHG

## 2025-01-15 DIAGNOSIS — Z30.432 ENCOUNTER FOR IUD REMOVAL: ICD-10-CM

## 2025-01-15 DIAGNOSIS — Z20.2 POSSIBLE EXPOSURE TO STD: ICD-10-CM

## 2025-01-15 DIAGNOSIS — Z12.4 SCREENING FOR CERVICAL CANCER: ICD-10-CM

## 2025-01-15 DIAGNOSIS — Z01.419 ENCNTR FOR GYN EXAM (GENERAL) (ROUTINE) W/O ABN FINDINGS: Primary | ICD-10-CM

## 2025-01-15 PROCEDURE — 87491 CHLMYD TRACH DNA AMP PROBE: CPT

## 2025-01-15 PROCEDURE — 87661 TRICHOMONAS VAGINALIS AMPLIF: CPT

## 2025-01-15 PROCEDURE — 87591 N.GONORRHOEAE DNA AMP PROB: CPT

## 2025-01-15 RX ORDER — BUPIVACAINE HYDROCHLORIDE 2.5 MG/ML
10 INJECTION, SOLUTION INFILTRATION; PERINEURAL ONCE
Status: COMPLETED | OUTPATIENT
Start: 2025-01-15 | End: 2025-01-16
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
## 2025-01-16 RX ADMIN — BUPIVACAINE HYDROCHLORIDE 25 MG: 2.5 INJECTION, SOLUTION INFILTRATION; PERINEURAL at 09:29

## 2025-01-16 ASSESSMENT — PAIN SCALES - GENERAL: PAINLEVEL_OUTOF10: 0 - NO PAIN

## 2025-01-21 ENCOUNTER — APPOINTMENT (OUTPATIENT)
Dept: OBSTETRICS AND GYNECOLOGY | Facility: CLINIC | Age: 25
End: 2025-01-21
Payer: COMMERCIAL

## 2025-01-21 VITALS — DIASTOLIC BLOOD PRESSURE: 60 MMHG | WEIGHT: 159 LBS | SYSTOLIC BLOOD PRESSURE: 100 MMHG | BODY MASS INDEX: 22.98 KG/M2

## 2025-01-21 DIAGNOSIS — Z30.430 ENCOUNTER FOR INSERTION OF PARAGARD IUD: ICD-10-CM

## 2025-01-21 LAB — PREGNANCY TEST URINE, POC: NEGATIVE

## 2025-01-21 PROCEDURE — 81025 URINE PREGNANCY TEST: CPT

## 2025-01-21 PROCEDURE — 58300 INSERT INTRAUTERINE DEVICE: CPT

## 2025-01-21 RX ORDER — BUPIVACAINE HYDROCHLORIDE 2.5 MG/ML
10 INJECTION, SOLUTION INFILTRATION; PERINEURAL ONCE
Status: COMPLETED | OUTPATIENT
Start: 2025-01-21 | End: 2025-01-21

## 2025-01-21 RX ADMIN — BUPIVACAINE HYDROCHLORIDE 25 MG: 2.5 INJECTION, SOLUTION INFILTRATION; PERINEURAL at 14:17

## 2025-01-22 DIAGNOSIS — R10.2 PELVIC PAIN IN FEMALE: ICD-10-CM

## 2025-01-22 DIAGNOSIS — K59.09 CHRONIC CONSTIPATION: ICD-10-CM

## 2025-01-22 RX ORDER — KETOROLAC TROMETHAMINE 10 MG/1
10 TABLET, FILM COATED ORAL EVERY 6 HOURS PRN
Qty: 20 TABLET | Refills: 0 | Status: SHIPPED | OUTPATIENT
Start: 2025-01-22 | End: 2025-01-27

## 2025-01-22 RX ORDER — LINACLOTIDE 290 UG/1
290 CAPSULE, GELATIN COATED ORAL DAILY
Qty: 30 CAPSULE | Refills: 2 | Status: SHIPPED | OUTPATIENT
Start: 2025-01-22

## 2025-01-28 ENCOUNTER — HOSPITAL ENCOUNTER (OUTPATIENT)
Dept: RADIOLOGY | Facility: CLINIC | Age: 25
Discharge: HOME | End: 2025-01-28
Payer: COMMERCIAL

## 2025-01-28 DIAGNOSIS — Z30.430 ENCOUNTER FOR INSERTION OF PARAGARD IUD: ICD-10-CM

## 2025-01-28 PROCEDURE — 76856 US EXAM PELVIC COMPLETE: CPT | Performed by: RADIOLOGY

## 2025-01-28 PROCEDURE — 76830 TRANSVAGINAL US NON-OB: CPT | Performed by: RADIOLOGY

## 2025-01-28 PROCEDURE — 76856 US EXAM PELVIC COMPLETE: CPT

## 2025-01-31 LAB
CYTOLOGY CMNT CVX/VAG CYTO-IMP: NORMAL
LAB AP HPV GENOTYPE QUESTION: YES
LAB AP HPV HR: NORMAL
LAB AP PAP ADDITIONAL TESTS: NORMAL
LABORATORY COMMENT REPORT: NORMAL
PATH REPORT.TOTAL CANCER: NORMAL

## 2025-02-05 ENCOUNTER — TELEPHONE (OUTPATIENT)
Dept: PRIMARY CARE | Facility: CLINIC | Age: 25
End: 2025-02-05
Payer: COMMERCIAL

## 2025-02-05 NOTE — TELEPHONE ENCOUNTER
"Pt called stating she has h/o SVT.  Having chest pain and sob.  Was told by Psych office, who she initially called, to go to ED.  Stated Mom looked up SVT and it's not always \"life threatening\".  I encouraged ED eval d/t current sx's.  Voiced understanding.  Will go to ED for eval today.  Recommended pt follow up w/you after ED eval.  Voiced understanding.  "

## 2025-02-11 ENCOUNTER — OFFICE VISIT (OUTPATIENT)
Dept: PRIMARY CARE | Facility: CLINIC | Age: 25
End: 2025-02-11
Payer: COMMERCIAL

## 2025-02-11 VITALS
DIASTOLIC BLOOD PRESSURE: 70 MMHG | HEART RATE: 83 BPM | RESPIRATION RATE: 18 BRPM | TEMPERATURE: 98.5 F | BODY MASS INDEX: 23.56 KG/M2 | SYSTOLIC BLOOD PRESSURE: 108 MMHG | OXYGEN SATURATION: 98 % | WEIGHT: 163 LBS

## 2025-02-11 DIAGNOSIS — J10.1 INFLUENZA A: Primary | ICD-10-CM

## 2025-02-11 DIAGNOSIS — R05.9 COUGH IN ADULT: ICD-10-CM

## 2025-02-11 LAB
POC RAPID INFLUENZA A: POSITIVE
POC RAPID INFLUENZA B: NEGATIVE
POC SARS-COV-2 AG BINAX: NORMAL

## 2025-02-11 PROCEDURE — 99213 OFFICE O/P EST LOW 20 MIN: CPT | Performed by: NURSE PRACTITIONER

## 2025-02-11 PROCEDURE — 87811 SARS-COV-2 COVID19 W/OPTIC: CPT | Performed by: NURSE PRACTITIONER

## 2025-02-11 PROCEDURE — 87804 INFLUENZA ASSAY W/OPTIC: CPT | Performed by: NURSE PRACTITIONER

## 2025-02-11 RX ORDER — ALBUTEROL SULFATE 90 UG/1
2 INHALANT RESPIRATORY (INHALATION) EVERY 4 HOURS PRN
Qty: 8.5 G | Refills: 0 | Status: SHIPPED | OUTPATIENT
Start: 2025-02-11 | End: 2025-03-13

## 2025-02-11 RX ORDER — OSELTAMIVIR PHOSPHATE 75 MG/1
75 CAPSULE ORAL 2 TIMES DAILY
Qty: 10 CAPSULE | Refills: 0 | Status: SHIPPED | OUTPATIENT
Start: 2025-02-11 | End: 2025-02-16

## 2025-02-11 RX ORDER — FLUTICASONE PROPIONATE 50 MCG
1 SPRAY, SUSPENSION (ML) NASAL DAILY
Qty: 16 G | Refills: 0 | Status: SHIPPED | OUTPATIENT
Start: 2025-02-11 | End: 2025-03-13

## 2025-02-11 ASSESSMENT — ENCOUNTER SYMPTOMS
SHORTNESS OF BREATH: 0
CHEST TIGHTNESS: 1
SINUS PRESSURE: 1
CHILLS: 1
ABDOMINAL PAIN: 0
RHINORRHEA: 1
SINUS PAIN: 1
NAUSEA: 0
SORE THROAT: 1
MYALGIAS: 1
COUGH: 1
APPETITE CHANGE: 1
HEADACHES: 1
VOMITING: 0
WHEEZING: 0
DIARRHEA: 0
FATIGUE: 1

## 2025-02-11 NOTE — PROGRESS NOTES
Subjective   Patient ID: Shelbi Jean is a 24 y.o. female who presents for Cough.    PT swabbed for covid and flu    Yesterday, she developed body aches, runny nose and sneezing. Patient has nasal congestion. Patient vomited this morning and has a very harsh cough. Patient has been trying steam treatments. Pt has been taking OTC sinus medication that is not really helping. No chest pain or difficulty breathing.          Review of Systems   Constitutional:  Positive for appetite change, chills and fatigue.   HENT:  Positive for congestion, postnasal drip, rhinorrhea, sinus pressure, sinus pain and sore throat.    Respiratory:  Positive for cough and chest tightness. Negative for shortness of breath and wheezing.    Cardiovascular:  Negative for chest pain.   Gastrointestinal:  Negative for abdominal pain, diarrhea, nausea and vomiting.   Musculoskeletal:  Positive for myalgias.   Neurological:  Positive for headaches.       Objective   /70   Pulse 83   Temp 36.9 °C (98.5 °F)   Resp 18   Wt 73.9 kg (163 lb)   LMP 01/10/2025   SpO2 98%   BMI 23.56 kg/m²     Physical Exam  Vitals reviewed.   Constitutional:       General: She is not in acute distress.     Appearance: She is ill-appearing. She is not toxic-appearing.   HENT:      Head: Atraumatic.      Right Ear: Tympanic membrane, ear canal and external ear normal.      Left Ear: Tympanic membrane, ear canal and external ear normal.      Nose: Congestion and rhinorrhea present.      Mouth/Throat:      Pharynx: No oropharyngeal exudate or posterior oropharyngeal erythema.      Comments: Uvula midline, moderate post nasal drainage  Eyes:      Conjunctiva/sclera: Conjunctivae normal.   Cardiovascular:      Rate and Rhythm: Normal rate and regular rhythm.      Heart sounds: Normal heart sounds. No murmur heard.  Pulmonary:      Effort: Pulmonary effort is normal.      Breath sounds: Normal breath sounds. No wheezing or rhonchi.      Comments: Pt has a dry  cough  Abdominal:      General: Bowel sounds are normal.      Palpations: Abdomen is soft.      Tenderness: There is no abdominal tenderness. There is no guarding.   Musculoskeletal:         General: Normal range of motion.   Skin:     General: Skin is warm and dry.   Neurological:      General: No focal deficit present.      Mental Status: She is alert.   Psychiatric:         Mood and Affect: Mood normal.         Assessment/Plan   Problem List Items Addressed This Visit    None  Visit Diagnoses         Codes    Influenza A    -  Primary J10.1    Relevant Medications    albuterol (ProAir HFA) 90 mcg/actuation inhaler    fluticasone (Flonase) 50 mcg/actuation nasal spray    oseltamivir (Tamiflu) 75 mg capsule    Cough in adult     R05.9    Relevant Orders    POCT Influenza A/B manually resulted (Completed)    POCT BinaxNOW Covid-19 Ag Card manually resulted (Completed)        Rapid COVID is negative.    Patient is positive for influenza A. Patient started on tamiflu. Discussed the negative side effects of the tamiflu and advised that she is to stop it if she experiences any negative side effects; she agreed. Patient started on an inhaler and flonase. Advised patient on use of humidifier and hot steam treatments. Discussed that patient is to drink plenty of fluids and stay well hydrated. Can take tylenol as needed for any fevers or discomfort. Discussed that patient is to go to the ER for any chest pain, difficulty breathing, shortness of breath or new/concerning symptoms; she agreed. Pt reminded to self quarantine; she agreed. She is to follow up in 2-3 days if no better despite the use of the medications.

## 2025-04-15 ENCOUNTER — APPOINTMENT (OUTPATIENT)
Dept: PRIMARY CARE | Facility: CLINIC | Age: 25
End: 2025-04-15
Payer: COMMERCIAL

## 2025-04-15 VITALS
OXYGEN SATURATION: 100 % | HEART RATE: 88 BPM | DIASTOLIC BLOOD PRESSURE: 72 MMHG | SYSTOLIC BLOOD PRESSURE: 110 MMHG | RESPIRATION RATE: 16 BRPM | HEIGHT: 70 IN | BODY MASS INDEX: 23.19 KG/M2 | WEIGHT: 162 LBS | TEMPERATURE: 98.7 F

## 2025-04-15 DIAGNOSIS — N92.1 MENORRHAGIA WITH IRREGULAR CYCLE: Primary | ICD-10-CM

## 2025-04-15 DIAGNOSIS — D64.9 ANEMIA, UNSPECIFIED TYPE: ICD-10-CM

## 2025-04-15 DIAGNOSIS — N93.9 ABNORMAL UTERINE BLEEDING (AUB): ICD-10-CM

## 2025-04-15 PROCEDURE — 3008F BODY MASS INDEX DOCD: CPT | Performed by: INTERNAL MEDICINE

## 2025-04-15 PROCEDURE — 99214 OFFICE O/P EST MOD 30 MIN: CPT | Performed by: INTERNAL MEDICINE

## 2025-04-15 RX ORDER — KETOROLAC TROMETHAMINE 10 MG/1
10 TABLET, FILM COATED ORAL EVERY 6 HOURS PRN
COMMUNITY

## 2025-04-15 RX ORDER — NAPROXEN 500 MG/1
500 TABLET ORAL
Qty: 60 TABLET | Refills: 2 | Status: SHIPPED | OUTPATIENT
Start: 2025-04-15

## 2025-04-15 NOTE — PROGRESS NOTES
"Subjective   Ayesha I Ted \"Shelbi\" is a 25 y.o. female who presents for Follow-up.    [facility]  ED eval in November d/t Vaginal bleeding.   Followed up w/ GYN.  Traumatic/triggering appt w/GYN 11/24. Addressing w/Psych  Rx'd Ketorolac 10 mg for cramping.  IUD changed 1/25.  C/o nausea, headache, abdominal discomfort x3 months x2 weeks before period.  Resolves after period.    Missed cardiology appt  Needs to schedule with dr marks      Review of Systems   Constitutional:  Negative for fatigue and fever.   Respiratory:  Negative for cough and shortness of breath.    Cardiovascular:  Negative for chest pain and leg swelling.   All other systems reviewed and are negative.      Health Maintenance Due   Topic Date Due    Medicare Annual Wellness Visit (AWV)  Never done    Hepatitis C Screening  Never done    COVID-19 Vaccine (4 - 2024-25 season) 09/01/2024       Objective   /72   Pulse 88   Temp 37.1 °C (98.7 °F)   Resp 16   Ht 1.772 m (5' 9.75\")   Wt 73.5 kg (162 lb)   SpO2 100%   BMI 23.41 kg/m²     Physical Exam  Vitals and nursing note reviewed.   Constitutional:       Appearance: Normal appearance.   HENT:      Head: Normocephalic.   Eyes:      Conjunctiva/sclera: Conjunctivae normal.      Pupils: Pupils are equal, round, and reactive to light.   Cardiovascular:      Rate and Rhythm: Normal rate and regular rhythm.      Pulses: Normal pulses.      Heart sounds: Normal heart sounds.   Pulmonary:      Effort: Pulmonary effort is normal.      Breath sounds: Normal breath sounds.   Musculoskeletal:         General: No swelling.      Cervical back: Neck supple.   Skin:     General: Skin is warm and dry.   Neurological:      General: No focal deficit present.      Mental Status: She is oriented to person, place, and time.         Assessment/Plan   Problem List Items Addressed This Visit       Abnormal uterine bleeding (AUB)    Relevant Medications    naproxen (Naprosyn) 500 mg tablet     Other Visit Diagnoses  "        Menorrhagia with irregular cycle    -  Primary      Anemia, unspecified type        Relevant Orders    CBC    Iron and TIBC    Ferritin          Check labs  Fu with gyn  Start naproxen when cramping begins  Stable based on symptoms and exam.  Continue established treatment plan and follow up at least yearly.  Reviewed records

## 2025-04-16 ASSESSMENT — ENCOUNTER SYMPTOMS
SHORTNESS OF BREATH: 0
FEVER: 0
FATIGUE: 0
COUGH: 0

## 2025-04-17 ENCOUNTER — OFFICE VISIT (OUTPATIENT)
Facility: CLINIC | Age: 25
End: 2025-04-17
Payer: COMMERCIAL

## 2025-04-17 VITALS
HEART RATE: 88 BPM | HEIGHT: 70 IN | SYSTOLIC BLOOD PRESSURE: 121 MMHG | TEMPERATURE: 97.7 F | WEIGHT: 161.4 LBS | DIASTOLIC BLOOD PRESSURE: 81 MMHG | BODY MASS INDEX: 23.11 KG/M2

## 2025-04-17 DIAGNOSIS — M79.7 FIBROMYALGIA: Primary | ICD-10-CM

## 2025-04-17 PROCEDURE — 3008F BODY MASS INDEX DOCD: CPT | Performed by: STUDENT IN AN ORGANIZED HEALTH CARE EDUCATION/TRAINING PROGRAM

## 2025-04-17 PROCEDURE — 99213 OFFICE O/P EST LOW 20 MIN: CPT | Performed by: STUDENT IN AN ORGANIZED HEALTH CARE EDUCATION/TRAINING PROGRAM

## 2025-04-17 NOTE — PROGRESS NOTES
Rheumatology Follow Up Outpatient  Note    Subjective   Shelbi Jean is a 25 y.o. female presenting today for Fibromyalgia.    History of Presenting Problem:   Rheum history:  She has recurrent episodes of pain all over her body for many years. Pain is in her shoulders, neck, hips, lower back, hips, knees and arms. She stays in bed with these episodes. She has chronic fatigue. She has chronic sleep issues. She has difficulty falling asleep. She doesn’t snore.     Reviewed C/T/LS spine xrays 8/2023 unremarkable  Labs 8/2023: EVA-, RF/CCP-, ESR/CRP normal. Vitamin D, B12 normal. TSH normal.   CBC normal and CMP with ALK 31 (L)    Interval history:   Overall she feels better than how she felt last time. She has less joint pain. She is eating better now. She lives in her own apartment. She continues to have low back pain.. She has been diagnose with mild MIKE.       Past Medical History:   Past Medical History:   Diagnosis Date    ADHD (attention deficit hyperactivity disorder) 2006    Allergic     Anemia     Anxiety     Arthritis     Cervicalgia 04/09/2021    Chronic neck and back pain    Depression     Hypertrophy of breast 04/09/2021    Macromastia    Hypertrophy of breast 04/09/2021    Large breasts    Personal history of other diseases of the musculoskeletal system and connective tissue 09/28/2020    History of neck pain    Personal history of other diseases of the respiratory system 10/10/2022    History of upper respiratory infection    Personal history of other mental and behavioral disorders     History of mental disorder    Substance abuse     Visual impairment        Allergies:   Allergies   Allergen Reactions    Fluoxetine Other     Other reaction(s): suicidal    causes depression per mom    Lurasidone Other     Other reaction(s): over dose/suicidal   suicidal    Sertraline Other     Other reaction(s): suicidal    causes depression per mom    Abreva [Docosanol] Unknown    Betamethasone Other    Bupropion Hcl  Unknown    Codeine Unknown    Lamotrigine Unknown     Other reaction(s): Mental Status Change   Suicidal    Nortriptyline Unknown    Paroxetine Hcl Unknown    Quetiapine Hives and Unknown    Zonisamide Unknown       Medications:   Current Outpatient Medications:     albuterol (ProAir HFA) 90 mcg/actuation inhaler, Inhale 2 puffs every 4 hours if needed for wheezing or shortness of breath., Disp: 8.5 g, Rfl: 0    asenapine (Saphris) SL tablet, Dissolve 1 tablet under the tongue every morning. And dissolve 2 tablets at bedtime (Patient taking differently: Take 1.5 tabs at bedtime.), Disp: , Rfl:     cabergoline (Dostinex) 0.5 mg tablet, Take 1 tablet (0.5 mg) by mouth 5 times a week., Disp: , Rfl:     cetirizine (ZyrTEC) 10 mg tablet, Take 1 tablet (10 mg) by mouth once daily., Disp: 30 tablet, Rfl: 3    clonazePAM (KlonoPIN) 2 mg tablet, Take 1 tablet (2 mg) by mouth as needed at bedtime for anxiety., Disp: , Rfl:     cloNIDine (Catapres) 0.3 mg tablet, Take 1 tablet (0.3 mg) by mouth once daily at bedtime., Disp: , Rfl:     dexmethylphenidate (Focalin) 10 mg tablet, Take 1 tablet (10 mg) by mouth once daily., Disp: , Rfl:     dexmethylphenidate XR (Focalin XR) 40 mg 24 hr capsule, Take 1 capsule (40 mg) by mouth early in the morning.., Disp: , Rfl:     escitalopram (Lexapro) 10 mg tablet, Take 1 tablet by mouth once daily. in addition to to 20mg daily, Disp: , Rfl:     escitalopram (Lexapro) 20 mg tablet, Take 1 tablet by mouth once daily. in addition to 10 mg daily, Disp: , Rfl:     fluticasone (Flonase) 50 mcg/actuation nasal spray, Administer 2 sprays into affected nostril(s) once daily., Disp: , Rfl:     fluticasone (Flonase) 50 mcg/actuation nasal spray, Administer 1 spray into each nostril once daily. Shake gently. Before first use, prime pump. After use, clean tip and replace cap., Disp: 16 g, Rfl: 0    iron sucrose (Venofer) 200 mg iron/10 mL injection, Infuse 10 mL (200 mg) over 5 minutes into a venous  "catheter every 7 days. (Patient not taking: No sig reported), Disp: 10 mL, Rfl: 3    ketorolac (Toradol) 10 mg tablet, Take 1 tablet (10 mg) by mouth every 6 hours if needed for moderate pain (4 - 6)., Disp: , Rfl:     lidocaine (Lidoderm) 5 % patch, Place 1 patch on the skin once daily. Remove & discard patch within 12 hours or as directed by MD., Disp: , Rfl:     Linzess 290 mcg capsule, TAKE 1 CAPSULE BY MOUTH ONCE DAILY, Disp: 30 capsule, Rfl: 2    metFORMIN XR (Glucophage-XR) 750 mg 24 hr tablet, Take 1 tablet (750 mg) by mouth 2 times a day., Disp: , Rfl:     naproxen (Naprosyn) 500 mg tablet, Take 1 tablet (500 mg) by mouth 2 times daily (morning and late afternoon)., Disp: 60 tablet, Rfl: 2    spironolactone (Aldactone) 50 mg tablet, Take 1 tablet (50 mg) by mouth 2 times a day., Disp: , Rfl:     Review of Systems:   All 10 review of systems have been reviewed and are negative for complaint except as noted in the HPI    Objective   Physical Examination:  Vitals:    04/17/25 1245   BP: 121/81   Pulse: 88   Temp: 36.5 °C (97.7 °F)     Growth %ile SmartLinks can only be used for patients less than 20 years old.  Ht Readings from Last 1 Encounters:   04/17/25 1.772 m (5' 9.75\")     Wt Readings from Last 1 Encounters:   04/17/25 73.2 kg (161 lb 6.4 oz)       General - NAD, sitting up in chair, well-groomed, pleasant, AAOx3  Head: Normocephalic, atraumatic  Eyes - PERRLA, EOMI. No conjunctiva injection.   Lungs - Symmetric chest expansion.  Skin - No rashes or ulcers. Skin warm and dry. No erythema on bilateral cheeks.  Extremities - No edema, cyanosis ,or clubbing  Neurological - Alert and oriented x 3,  grossly intact. No focal deficit.      Assessment/Plan   Shelbi Jean is a 25 y.o. female with PMHx of depression, cervical radiculopathy, hypothyroidism, PCOS, PFS, prolactinoma, PTSD who presenting today for follow up on fibromyalgia      ## Fibromyalgia:  -Fibromyalgia WPI=7 SSc=11 (2a=9 2b=2)   -Didn't " tolerate Flexeril PRN  -Sleep medicine referral: home sleep apnea showed mild MIKE and advised to follow with sleep specialist  -Advised patient to improve sleep hygiene, continue to exercise and lose weight  -Reviewed C/T/LS spine xrays 8/2023 unremarkable  -Referred to functional medicine    ##Hypophosphatasia:  -CMP showed ALK 32 in 2023>>>its normal now in 3/2025  -Urine PEP normal.   -Labs 8/2023: EVA-, RF/CCP-, ESR/CRP normal. Vitamin D, B12 normal. TSH normal.         Xenia Proctor MD  Clinical   Department of Rheumatology   [facility]

## 2025-05-08 ENCOUNTER — APPOINTMENT (OUTPATIENT)
Dept: PRIMARY CARE | Facility: CLINIC | Age: 25
End: 2025-05-08
Payer: COMMERCIAL

## 2025-05-20 ENCOUNTER — APPOINTMENT (OUTPATIENT)
Dept: OBSTETRICS AND GYNECOLOGY | Facility: CLINIC | Age: 25
End: 2025-05-20
Payer: COMMERCIAL

## 2025-05-20 VITALS
BODY MASS INDEX: 24.08 KG/M2 | HEIGHT: 70 IN | SYSTOLIC BLOOD PRESSURE: 136 MMHG | DIASTOLIC BLOOD PRESSURE: 90 MMHG | WEIGHT: 168.2 LBS

## 2025-05-20 DIAGNOSIS — R10.2 PELVIC PAIN: Primary | ICD-10-CM

## 2025-05-20 DIAGNOSIS — Z30.430 ENCOUNTER FOR INTRAUTERINE DEVICE PLACEMENT: ICD-10-CM

## 2025-05-20 PROCEDURE — 99212 OFFICE O/P EST SF 10 MIN: CPT | Performed by: ADVANCED PRACTICE MIDWIFE

## 2025-05-20 PROCEDURE — 3008F BODY MASS INDEX DOCD: CPT | Performed by: ADVANCED PRACTICE MIDWIFE
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
## 2025-05-27 ENCOUNTER — APPOINTMENT (OUTPATIENT)
Dept: RADIOLOGY | Facility: HOSPITAL | Age: 25
End: 2025-05-27
Payer: COMMERCIAL

## 2025-05-29 ENCOUNTER — APPOINTMENT (OUTPATIENT)
Dept: RADIOLOGY | Facility: HOSPITAL | Age: 25
End: 2025-05-29
Payer: COMMERCIAL

## 2025-05-29 DIAGNOSIS — Z30.430 ENCOUNTER FOR INTRAUTERINE DEVICE PLACEMENT: ICD-10-CM

## 2025-05-29 PROCEDURE — 76856 US EXAM PELVIC COMPLETE: CPT

## 2025-06-03 NOTE — PROGRESS NOTES
"    History: Ayesha \"Shelbi\" is here for her right shoulder.    Past medical history: Multiple  Medications: Multiple  Allergies: No known drug allergies    Please refer to the intake H&P regarding the patient's review of systems, family history and social history as was done today    HEENT: Normal  Lungs: Clear to auscultation  Heart: RRR  Abdomen: Soft, nontender  Skin: clear  Extremity: []   Contralateral exam is normal for strength, motion, stability and neurovascular assessment.    Radiographs: []     Assessment: []     Plan: []   All questions were answered today with the patient.    Scribe Attestation:  By signing my name below, I, Caprice Osorio attest that this documentation has been prepared under the direction and in the presence of Mook Jaeger MD.    Provider Attestation - Scribe documentation:  All medical record entries made by Joanne Frankel were at my direction and personally dictated by me, Mook Jaeger MD. I have reviewed the chart and agree that the record is accurate and I confirmed that it reflects my personal performance of the history, physical exam, discussion, and plan.    "

## 2025-06-04 ENCOUNTER — APPOINTMENT (OUTPATIENT)
Dept: ORTHOPEDIC SURGERY | Facility: CLINIC | Age: 25
End: 2025-06-04
Payer: COMMERCIAL

## 2025-06-06 PROBLEM — F31.9 BIPOLAR DEPRESSION (MULTI): Status: RESOLVED | Noted: 2023-03-19 | Resolved: 2025-06-06

## 2025-06-06 PROBLEM — T43.011A OVERDOSE OF TRICYCLIC ANTIDEPRESSANTS: Status: RESOLVED | Noted: 2020-03-10 | Resolved: 2025-06-06

## 2025-06-06 PROBLEM — E72.59: Status: RESOLVED | Noted: 2020-07-17 | Resolved: 2025-06-06

## 2025-06-06 PROBLEM — F12.10 CANNABIS ABUSE: Status: RESOLVED | Noted: 2020-03-13 | Resolved: 2025-06-06

## 2025-06-06 PROBLEM — Z98.890 STATUS POST BILATERAL BREAST REDUCTION: Status: RESOLVED | Noted: 2023-03-19 | Resolved: 2025-06-06

## 2025-06-06 PROBLEM — F90.9 ATTENTION DEFICIT HYPERACTIVITY DISORDER (ADHD): Status: RESOLVED | Noted: 2023-03-19 | Resolved: 2025-06-06

## 2025-06-09 ENCOUNTER — APPOINTMENT (OUTPATIENT)
Dept: ORTHOPEDIC SURGERY | Facility: CLINIC | Age: 25
End: 2025-06-09
Payer: COMMERCIAL

## 2025-06-10 ENCOUNTER — PREP FOR PROCEDURE (OUTPATIENT)
Dept: OBSTETRICS AND GYNECOLOGY | Facility: CLINIC | Age: 25
End: 2025-06-10

## 2025-06-10 ENCOUNTER — APPOINTMENT (OUTPATIENT)
Dept: OBSTETRICS AND GYNECOLOGY | Facility: CLINIC | Age: 25
End: 2025-06-10
Payer: COMMERCIAL

## 2025-06-10 DIAGNOSIS — R10.2 PELVIC PAIN IN FEMALE: ICD-10-CM

## 2025-06-10 DIAGNOSIS — Z30.2 ENCOUNTER FOR STERILIZATION: Primary | ICD-10-CM

## 2025-06-10 DIAGNOSIS — T83.32XA DISPLACEMENT OF INTRAUTERINE CONTRACEPTIVE DEVICE, INITIAL ENCOUNTER: ICD-10-CM

## 2025-06-10 DIAGNOSIS — T83.32XD DISPLACEMENT OF INTRAUTERINE CONTRACEPTIVE DEVICE, SUBSEQUENT ENCOUNTER: ICD-10-CM

## 2025-06-10 DIAGNOSIS — Z30.2 REQUEST FOR STERILIZATION: Primary | ICD-10-CM

## 2025-06-10 PROBLEM — E66.811 OBESITY, CLASS I, BMI 30-34.9: Status: RESOLVED | Noted: 2020-03-13 | Resolved: 2025-06-10

## 2025-06-10 PROBLEM — N93.9 ABNORMAL UTERINE BLEEDING (AUB): Status: RESOLVED | Noted: 2024-11-13 | Resolved: 2025-06-10

## 2025-06-10 PROCEDURE — 99215 OFFICE O/P EST HI 40 MIN: CPT | Performed by: OBSTETRICS & GYNECOLOGY

## 2025-06-10 RX ORDER — ACETAMINOPHEN 325 MG/1
975 TABLET ORAL ONCE
OUTPATIENT
Start: 2025-06-10 | End: 2025-06-10

## 2025-06-10 RX ORDER — SODIUM CHLORIDE, SODIUM LACTATE, POTASSIUM CHLORIDE, CALCIUM CHLORIDE 600; 310; 30; 20 MG/100ML; MG/100ML; MG/100ML; MG/100ML
20 INJECTION, SOLUTION INTRAVENOUS CONTINUOUS
OUTPATIENT
Start: 2025-06-10 | End: 2025-06-11

## 2025-06-10 RX ORDER — CELECOXIB 400 MG/1
400 CAPSULE ORAL ONCE
OUTPATIENT
Start: 2025-06-10 | End: 2025-06-10

## 2025-06-10 RX ORDER — TRANEXAMIC ACID 650 MG/1
1300 TABLET ORAL ONCE
OUTPATIENT
Start: 2025-06-10 | End: 2025-06-10

## 2025-06-10 RX ORDER — GABAPENTIN 600 MG/1
600 TABLET ORAL ONCE
OUTPATIENT
Start: 2025-06-10 | End: 2025-06-10

## 2025-06-10 NOTE — PROGRESS NOTES
GYNECOLOGY VIRTUAL PROGRESS NOTE          CC:     Chief Complaint   Patient presents with    Preop Visit    Results     Tubal surgery consultation, US results        HPI:  Ayesha Jean is scheduled today for virtual sterilization consultation.      Telemedicine (audio and visual) new GYN visit scheduled today with patient.  Verbal consent obtained for telemedicine visit.  Provider full name and licensure ([place] permanent license) discussed with patient.   Also discussed with the patient that as of 1/2025 she will need seen in the office setting once a year to be able to continue telemedicine GYN care, with 1st visit scheduled no more than months from the initial telemedicine new GYN visit.      No childbearing plans ever.  No GYN c/o today.  Patient 100% certain about decision for sterilization.  No issues previously with surgery or anesthesia.  Current BC is Paragard IUD--2nd done, 1st placed in 2018 and replaced 1/2025 due to displacement.  Had US recently for pelvic pain and needs to discuss results.    Patient has a history of benzodiazepine substance abuse, no history of opiate substance abuse.  Remote history of cocaine use greater than 5 years ago.      ROS:  GEN - no fevers or chills  RESP - no SOB or cough  GYN - see HPI        HISTORY:  Past Medical History:  2006: ADHD (attention deficit hyperactivity disorder)  10/21/2013: ADHD (attention deficit hyperactivity disorder), combined   type  No date: Anemia  No date: Anxiety  No date: Arthritis  01/07/2018: Bipolar I disorder, severe, current or most recent   episode depressed, with psychotic features (Multi)  04/19/2019: Borderline personality disorder (Multi)  04/09/2021: Cervicalgia      Comment:  Chronic neck and back pain  01/07/2018: Cocaine dependence, uncomplicated (Multi)  No date: Depression  04/09/2021: Hypertrophy of breast      Comment:  Large breasts  03/10/2020: Overdose of tricyclic antidepressants  03/19/2023: PCOS (polycystic ovarian  syndrome)  12/04/2014: PMDD (premenstrual dysphoric disorder)  03/19/2023: Prolactinoma (Multi)  03/19/2023: PTSD (post-traumatic stress disorder)  11/13/2016: Schizoaffective disorder, bipolar type (Multi)  No date: Substance abuse  08/06/2017: Suicide attempt (Multi)  No date: Visual impairment  05/01/2024: Anoop second degree AV block  Past Surgical History:  No date: ADENOIDECTOMY  No date: BREAST SURGERY  05/13/2020: OTHER SURGICAL HISTORY      Comment:  Tonsillectomy  No date: TONSILLECTOMY  No date: WISDOM TOOTH EXTRACTION   reports that she has quit smoking. Her smoking use included cigarettes. She has never used smokeless tobacco. She reports that she does not currently use alcohol. She reports current drug use. Drug: Marijuana.       PHYSICAL EXAM:  VS:  n/a (virtual visit)  GEN:  A&O, NAD  ABD:  no umbilical hernias  PSYCH:  normal affect, non-anxious      PATH RESULTS:  1/15/25 - pap wnl      IMAGING RESULTS:    05/29/25 - US PELVIS  - Impression -  1. Abnormal examination. Low-lying IUD.. It appears to be within the  lower uterine segment.  2. Normal left ovary. Right ovary not seen.    **I personally reviewed the pelvic ultrasound images and my impressions are IUD in lower uterine segment, normal LT ovary, RT not visualized, no fibroids, no free fluid.       IMPRESSION/PLAN:  Problem List Items Addressed This Visit    None  Visit Diagnoses         Request for sterilization    -  Primary      Pelvic pain in female          Displacement of intrauterine contraceptive device, initial encounter              Sterilization consultation:  All alternatives to sterilization reviewed, including hormonal birth control and LARCs and vasectomy.  Patient 100% certain about her decision to proceed.  Surgical options for sterilization reviewed--including Filshie clips, bilateral partial salpingectomy, and complete salpingectomy.  Decrease in future ovarian cancer risk with complete salpingectomy reviewed with  patient, patient elects to proceed with this surgical option.  Reversal options reviewed (and usually self-pay, none and need for IVF if complete salpingectomy).  [facility] sterilization papers 5/6/25 with CNM, mandatory 30-day waiting period reviewed.  Surgical consent patient for electronic signature due to virtual visit today, surgery risks reviewed (bleeding, infection, anesthesia complications, sterilization regret, increased ectopic pregnancy risk, injury to other tissues and organs or vessels, adhesions, need for re-operation).  Patient would accept a blood transfusion.  Preoperative and postoperative ERAS protocol and Rx reviewed with the patient and use/AE reviewed.  Surgery comorbidities include SVT (no beta-blocker use), THC use, Hx of benzodiazepine addiction.  PAT labs ordered.  COVID and surgery counseling done.  Pre-op and postop instructions  were reviewed with the patient.    Pelvic pain:  US with displaced Paragard IUD in [place].  Commend continuation of IUD and addition of condoms until surgery to prevent unplanned pregnancy in the setting of displaced nonhormonal IUD.      Office will call patient to schedule sterilization surgery.      Chavez Lopez DO

## 2025-06-11 PROBLEM — T83.32XA DISPLACEMENT OF INTRAUTERINE CONTRACEPTIVE DEVICE: Status: ACTIVE | Noted: 2025-06-10

## 2025-06-11 PROBLEM — Z30.2 ENCOUNTER FOR STERILIZATION: Status: ACTIVE | Noted: 2025-06-10

## 2025-06-13 ENCOUNTER — OFFICE VISIT (OUTPATIENT)
Dept: URGENT CARE | Age: 25
End: 2025-06-13
Payer: COMMERCIAL

## 2025-06-13 VITALS
SYSTOLIC BLOOD PRESSURE: 109 MMHG | TEMPERATURE: 97.6 F | HEIGHT: 70 IN | HEART RATE: 72 BPM | OXYGEN SATURATION: 99 % | WEIGHT: 168 LBS | RESPIRATION RATE: 20 BRPM | BODY MASS INDEX: 24.05 KG/M2 | DIASTOLIC BLOOD PRESSURE: 76 MMHG

## 2025-06-13 DIAGNOSIS — L03.113 CELLULITIS OF RIGHT UPPER EXTREMITY: Primary | ICD-10-CM

## 2025-06-13 RX ORDER — BACITRACIN ZINC 500 UNIT/G
OINTMENT (GRAM) TOPICAL 2 TIMES DAILY
Qty: 14 G | Refills: 0 | Status: SHIPPED | OUTPATIENT
Start: 2025-06-13 | End: 2025-06-23

## 2025-06-13 RX ORDER — CEPHALEXIN 500 MG/1
500 CAPSULE ORAL 4 TIMES DAILY
Qty: 28 CAPSULE | Refills: 0 | Status: SHIPPED | OUTPATIENT
Start: 2025-06-13 | End: 2025-06-20

## 2025-06-13 NOTE — PROGRESS NOTES
"Subjective   Patient ID: Ayesha Jean \"Shelbi\" is a 25 y.o. female. They present today with a chief complaint of Other (RT top arm tattoo infection. She got the tattoo I weak ago. 6/6).    History of Present Illness  Subjective  The patient presents with right upper extremity pain, swelling, and tenderness. One week after she got a new tattoo. She endorses scabbing and redness but denies purulent drainage or fever. She does not have a history of type 2 diabetes or immune compromised status but stated that she visited the  who informed her that it was likely infected, and she should seek evaluation.      Review of Systems  Constitutional: Negative for fever, chills, anorexia, weight loss, malaise   Musculoskeletal: Negative for decreased ROM, swelling, weakness   Neurological: Negative for paresthesia, numbness, weakness  Skin: See HPI  Hematologic/Lymph: Negative for anemia, bruising, easy bleeding  All other systems are negative        History provided by:  Patient   used: No        Past Medical History  Allergies as of 06/13/2025 - Reviewed 06/13/2025   Allergen Reaction Noted    Fluoxetine Other 09/29/2014    Lurasidone Other 07/25/2016    Sertraline Other 09/29/2014    Abreva [docosanol] Unknown 03/09/2023    Betamethasone Other 06/16/2023    Bupropion hcl Unknown 03/09/2023    Codeine Unknown 03/09/2023    Lamotrigine Unknown 05/26/2018    Nortriptyline Unknown 03/09/2023    Paroxetine hcl Unknown 03/09/2023    Quetiapine Hives and Unknown 03/09/2023    Zonisamide Unknown 03/09/2023       Prescriptions Prior to Admission[1]     Medical History[2]    Surgical History[3]     reports that she has quit smoking. Her smoking use included cigarettes. She has never used smokeless tobacco. She reports that she does not currently use alcohol. She reports current drug use. Drug: Marijuana.    Review of Systems  Review of Systems                               Objective    Vitals:    " "06/13/25 1846   BP: 109/76   Pulse: 72   Resp: 20   Temp: 36.4 °C (97.6 °F)   SpO2: 99%   Weight: 76.2 kg (168 lb)   Height: 1.772 m (5' 9.75\")     Patient's last menstrual period was 05/07/2025.    Physical Exam    Procedures    Point of Care Test & Imaging Results from this visit  No results found for this visit on 06/13/25.   Imaging  No results found.    Cardiology, Vascular, and Other Imaging  No other imaging results found for the past 2 days      Diagnostic study results (if any) were reviewed by GUILLERMO Best.    Assessment/Plan   Allergies, medications, history, and pertinent labs/EKGs/Imaging reviewed by GUILLERMO Best.     Medical Decision Making    On exam, the patient's vital signs are stable, and she is in no acute distress without tachycardia, fever, or hypotension. There are no signs of systemic illness or sepsis. There was a 7 centimeter by 5-centimeter area of scabbing with surrounding erythema, warmth and tenderness. There was no drainage and no evidence of abscess. We discussed local wound care measures and the need to monitor for any worsening of the swelling, redness, pain, or if any drainage develops. I prescribed cephalexin 4 times daily for seven days with bacitracin topical. At this time, she is appropriate for outpatient management, and she had no questions prior to her discharge. Her tetanus immunization is current.     Orders and Diagnoses  Diagnoses and all orders for this visit:  Cellulitis of right upper extremity  -     cephalexin (Keflex) 500 mg capsule; Take 1 capsule (500 mg) by mouth 4 times a day for 7 days.  -     bacitracin 500 unit/gram ointment; Apply topically 2 times a day for 10 days.      Medical Admin Record      Patient disposition: { Disposition:[id number]}    Electronically signed by GUILLERMO Best  7:11 PM           [1] (Not in a hospital admission)  [2]   Past Medical History:  Diagnosis Date    ADHD (attention deficit hyperactivity " disorder), combined type 10/21/2013    Anemia     Anxiety     Arthritis     Bipolar I disorder, severe, current or most recent episode depressed, with psychotic features (Multi) 01/07/2018    Borderline personality disorder (Multi) 04/19/2019    Cervicalgia 04/09/2021    Chronic neck and back pain    Cocaine dependence, uncomplicated (Multi) 01/07/2018    Depression     Overdose of tricyclic antidepressants 03/10/2020    PCOS (polycystic ovarian syndrome) 03/19/2023    PMDD (premenstrual dysphoric disorder) 12/04/2014    Prolactinoma (Multi) 03/19/2023    PTSD (post-traumatic stress disorder) 03/19/2023    Schizoaffective disorder, bipolar type (Multi) 11/13/2016    Substance abuse     Suicide attempt (Multi) 08/06/2017    Visual impairment     Anoop second degree AV block 05/01/2024   [3]   Past Surgical History:  Procedure Laterality Date    ADENOIDECTOMY      BREAST SURGERY      OTHER SURGICAL HISTORY  05/13/2020    Tonsillectomy    TONSILLECTOMY      WISDOM TOOTH EXTRACTION        SURGICAL HISTORY  05/13/2020    Tonsillectomy    TONSILLECTOMY      WISDOM TOOTH EXTRACTION

## 2025-06-19 ENCOUNTER — APPOINTMENT (OUTPATIENT)
Dept: OBSTETRICS AND GYNECOLOGY | Facility: CLINIC | Age: 25
End: 2025-06-19
Payer: COMMERCIAL

## 2025-07-10 ENCOUNTER — OFFICE VISIT (OUTPATIENT)
Dept: URGENT CARE | Age: 25
End: 2025-07-10
Payer: COMMERCIAL

## 2025-07-10 VITALS
TEMPERATURE: 98.9 F | SYSTOLIC BLOOD PRESSURE: 103 MMHG | DIASTOLIC BLOOD PRESSURE: 66 MMHG | OXYGEN SATURATION: 100 % | HEART RATE: 74 BPM | BODY MASS INDEX: 22.9 KG/M2 | RESPIRATION RATE: 16 BRPM | HEIGHT: 70 IN | WEIGHT: 160 LBS

## 2025-07-10 DIAGNOSIS — L03.113 CELLULITIS OF RIGHT UPPER EXTREMITY: Primary | ICD-10-CM

## 2025-07-10 RX ORDER — MUPIROCIN 20 MG/G
OINTMENT TOPICAL
Qty: 22 G | Refills: 0 | Status: SHIPPED | OUTPATIENT
Start: 2025-07-10 | End: 2025-07-20

## 2025-07-10 RX ORDER — CEPHALEXIN 500 MG/1
500 CAPSULE ORAL 2 TIMES DAILY
Qty: 20 CAPSULE | Refills: 0 | Status: SHIPPED | OUTPATIENT
Start: 2025-07-10 | End: 2025-07-20

## 2025-07-10 NOTE — PROGRESS NOTES
"Subjective   Patient ID: Ayesha Jean \"Halima" is a 25 y.o. female. They present today with a chief complaint of Tattoo infection (X1 month; was seen last month and it hasn't completely healed).      History of Present Illness  Subjective  Shelbi Jean is a 25 y.o. female who presents for evaluation of a possible skin infection located on the right, upper arm. Onset of symptoms was gradual starting 1 month ago, with gradually improving course since that time. Symptoms include discoloration and warmth to area. Patient denies chills and fever greater than 100. There is a history of trauma to the area (recent tattoo). Treatment to date has included antibiotics started several weeks ago with moderate relief.    Objective  /66   Pulse 74   Temp 37.2 °C (98.9 °F) (Oral)   Resp 16   Ht 1.765 m (5' 9.5\")   Wt 72.6 kg (160 lb)   LMP 07/03/2025 (Exact Date)   SpO2 100%   BMI 23.29 kg/m²   [unfilled]    Assessment/Plan  Cellulitis of right, upper arm    Keflex prescribed.        History provided by:  Patient and medical records        Past Medical History  Allergies as of 07/10/2025 - Reviewed 07/10/2025   Allergen Reaction Noted    Fluoxetine Other 09/29/2014    Lurasidone Other 07/25/2016    Sertraline Other 09/29/2014    Abreva [docosanol] Unknown 03/09/2023    Betamethasone Other 06/16/2023    Bupropion hcl Unknown 03/09/2023    Codeine Unknown 03/09/2023    Lamotrigine Unknown 05/26/2018    Nortriptyline Unknown 03/09/2023    Paroxetine hcl Unknown 03/09/2023    Quetiapine Hives and Unknown 03/09/2023    Zonisamide Unknown 03/09/2023       Prescriptions Prior to Admission[1]     Current Medications[2]    Problem List[3]    Surgical History[4]     reports that she has quit smoking. Her smoking use included cigarettes. She has never used smokeless tobacco. She reports that she does not currently use alcohol. She reports current drug use. Drug: Marijuana.    Review of Systems  As noted in HPI. ROS otherwise " "negative unless noted.       Objective    Vitals:    07/10/25 1820   BP: 103/66   Pulse: 74   Resp: 16   Temp: 37.2 °C (98.9 °F)   TempSrc: Oral   SpO2: 100%   Weight: 72.6 kg (160 lb)   Height: 1.765 m (5' 9.5\")     Patient's last menstrual period was 07/03/2025 (exact date).    Physical Exam  Vitals and nursing note reviewed.   Constitutional:       General: She is not in acute distress.     Appearance: Normal appearance. She is not ill-appearing.   HENT:      Head: Normocephalic and atraumatic.   Cardiovascular:      Rate and Rhythm: Normal rate and regular rhythm.   Pulmonary:      Effort: Pulmonary effort is normal.      Breath sounds: Normal breath sounds.   Abdominal:      General: Bowel sounds are normal.      Palpations: Abdomen is soft.   Musculoskeletal:      Cervical back: Normal range of motion and neck supple.   Skin:     General: Skin is warm and dry.      Capillary Refill: Capillary refill takes less than 2 seconds.             Comments: Area has improved in size from previous visit; now is 3 cm w x 1 cm h.    Neurological:      Mental Status: She is alert and oriented to person, place, and time.   Psychiatric:         Behavior: Behavior normal.           Procedures    Point of Care Test & Imaging Results from this visit  Results for orders placed or performed in visit on 02/11/25   POCT Influenza A/B manually resulted    Collection Time: 02/11/25  4:06 PM   Result Value Ref Range    POC Rapid Influenza A Positive (A) Negative    POC Rapid Influenza B Negative Negative   POCT BinaxNOW Covid-19 Ag Card manually resulted    Collection Time: 02/11/25  4:06 PM   Result Value Ref Range    POC CÉSAR-COV-2 AG  Presumptive negative test for SARS-CoV-2 (no antigen detected)     Presumptive negative test for SARS-CoV-2 (no antigen detected)            Diagnostic study results (if any) were reviewed.  (If applicable) preliminary radiology reading: [none]    Assessment/Plan   Allergies, medications, history, and " pertinent labs/EKGs/Imaging reviewed.        Medical Decision Making  Risks, benefits, and alternatives of the medications and treatment plan prescribed today were discussed, and patient expressed understanding. Plan follow up as discussed or as needed if any worsening symptoms or change in condition. Reinforced red flags including (but not limited to): severe or worsening pain; difficulty swallowing; stiff neck; shortness of breath; coughing or vomiting blood; chest pain; and new or increased fever are indications to go to the Emergency Department.  At time of discharge patient was clinically well-appearing and HDS for outpatient management. The patient and/or family was educated regarding diagnosis, supportive care, OTC and Rx medications. The patient and/or family was given the opportunity to ask questions prior to discharge.  They verbalized understanding of my discussion of the plans for treatment, expected course, indications to return to  or seek further evaluation in ED, and the need for timely follow up as directed.   They were provided with a work/school excuse if requested. The after-visit summary was given to the patient and care instructions were reviewed with the patient. All questions were answered and the patient verbalized understanding of the plan of care for today.  Plan:  Recent visit notes reviewed  Meds as above  Increase clear fluids  Pcp follow up this week if not improving or worsening  ER visit anytime 24/7 for acute worsening or changing condition    Orders and Diagnoses  Diagnoses and all orders for this visit:  Cellulitis of right upper extremity  -     mupirocin (Bactroban) 2 % ointment; Apply topically 3 times a day for 10 days.  -     cephalexin (Keflex) 500 mg capsule; Take 1 capsule (500 mg) by mouth 2 times a day for 10 days.      Medical Admin Record      Follow Up Instructions  No follow-ups on file.    Patient disposition: Home  PILY Kapadia-CNP           [1] (Not  in a hospital admission)   [2]   Current Outpatient Medications   Medication Sig Dispense Refill    albuterol (ProAir HFA) 90 mcg/actuation inhaler Inhale 2 puffs every 4 hours if needed for wheezing or shortness of breath. 8.5 g 0    asenapine (Saphris) SL tablet Dissolve 1 tablet under the tongue every morning. And dissolve 2 tablets at bedtime (Patient taking differently: Take 1.5 tabs at bedtime.)      cabergoline (Dostinex) 0.5 mg tablet Take 1 tablet (0.5 mg) by mouth 5 times a week.      cephalexin (Keflex) 500 mg capsule Take 1 capsule (500 mg) by mouth 2 times a day for 10 days. 20 capsule 0    cetirizine (ZyrTEC) 10 mg tablet Take 1 tablet (10 mg) by mouth once daily. 30 tablet 3    clonazePAM (KlonoPIN) 2 mg tablet Take 1 tablet (2 mg) by mouth as needed at bedtime for anxiety.      cloNIDine (Catapres) 0.3 mg tablet Take 1 tablet (0.3 mg) by mouth once daily at bedtime.      dexmethylphenidate (Focalin) 10 mg tablet Take 1 tablet (10 mg) by mouth once daily.      dexmethylphenidate XR (Focalin XR) 40 mg 24 hr capsule Take 1 capsule (40 mg) by mouth early in the morning..      escitalopram (Lexapro) 10 mg tablet Take 1 tablet by mouth once daily. in addition to to 20mg daily      escitalopram (Lexapro) 20 mg tablet Take 1 tablet by mouth once daily. in addition to 10 mg daily      fluticasone (Flonase) 50 mcg/actuation nasal spray Administer 2 sprays into affected nostril(s) once daily.      fluticasone (Flonase) 50 mcg/actuation nasal spray Administer 1 spray into each nostril once daily. Shake gently. Before first use, prime pump. After use, clean tip and replace cap. 16 g 0    iron sucrose (Venofer) 200 mg iron/10 mL injection Infuse 10 mL (200 mg) over 5 minutes into a venous catheter every 7 days. (Patient not taking: No sig reported) 10 mL 3    ketorolac (Toradol) 10 mg tablet Take 1 tablet (10 mg) by mouth every 6 hours if needed for moderate pain (4 - 6).      lidocaine (Lidoderm) 5 % patch Place  1 patch on the skin once daily. Remove & discard patch within 12 hours or as directed by MD.      Linzess 290 mcg capsule TAKE 1 CAPSULE BY MOUTH ONCE DAILY 30 capsule 2    metFORMIN XR (Glucophage-XR) 750 mg 24 hr tablet Take 1 tablet (750 mg) by mouth 2 times a day.      mupirocin (Bactroban) 2 % ointment Apply topically 3 times a day for 10 days. 22 g 0    naproxen (Naprosyn) 500 mg tablet Take 1 tablet (500 mg) by mouth 2 times daily (morning and late afternoon). 60 tablet 2    spironolactone (Aldactone) 50 mg tablet Take 1 tablet (50 mg) by mouth 2 times a day.       No current facility-administered medications for this visit.   [3]   Patient Active Problem List  Diagnosis    Chronic constipation    Generalized headaches    Hypothyroidism    PCOS (polycystic ovarian syndrome)    Prolactinoma (Multi)    PTSD (post-traumatic stress disorder)    Osteoarthritis of spine with radiculopathy, cervical region    Anxiety    Bipolar I disorder, severe, current or most recent episode depressed, with psychotic features (Multi)    Borderline personality disorder (Multi)    Cannabis dependence, uncomplicated (Multi)    Cocaine dependence, uncomplicated (Multi)    Hyperprolactinemia (Multi)    PMDD (premenstrual dysphoric disorder)    MDD (major depressive disorder)    Suicide attempt (Multi)    ADHD (attention deficit hyperactivity disorder), combined type    Schizoaffective disorder, bipolar type (Multi)    Erosion of teeth due to medicine    Hypophosphatasia    Postural lightheadedness    Tremor    Vasovagal syncope    Anorexia nervosa, restricting type    Disturbance in sleep behavior    SVT (supraventricular tachycardia)    Wenckebach second degree AV block    Encounter for sterilization    Displacement of intrauterine contraceptive device   [4]   Past Surgical History:  Procedure Laterality Date    ADENOIDECTOMY      BREAST SURGERY      OTHER SURGICAL HISTORY  05/13/2020    Tonsillectomy    TONSILLECTOMY      QUANG  TOOTH EXTRACTION

## 2025-07-17 ENCOUNTER — TELEPHONE (OUTPATIENT)
Dept: PRIMARY CARE | Facility: CLINIC | Age: 25
End: 2025-07-17
Payer: COMMERCIAL

## 2025-07-17 NOTE — TELEPHONE ENCOUNTER
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  Outbreaks usually on corners of lips, never directly on lips.  Noted cut on lip this AM.  Unsure if should be taking action in preparation for possible cold sore.  Traveling this weekend.  Should she plan on changing tooth brush, make up products, etc?

## 2025-07-22 ENCOUNTER — APPOINTMENT (OUTPATIENT)
Dept: OBSTETRICS AND GYNECOLOGY | Facility: CLINIC | Age: 25
End: 2025-07-22
Payer: COMMERCIAL

## 2025-07-23 ENCOUNTER — APPOINTMENT (OUTPATIENT)
Dept: OBSTETRICS AND GYNECOLOGY | Facility: CLINIC | Age: 25
End: 2025-07-23
Payer: COMMERCIAL

## 2025-07-30 ENCOUNTER — APPOINTMENT (OUTPATIENT)
Dept: OBSTETRICS AND GYNECOLOGY | Facility: CLINIC | Age: 25
End: 2025-07-30
Payer: COMMERCIAL

## 2025-07-30 ENCOUNTER — OFFICE VISIT (OUTPATIENT)
Dept: URGENT CARE | Age: 25
End: 2025-07-30
Payer: COMMERCIAL

## 2025-07-30 VITALS
BODY MASS INDEX: 25.62 KG/M2 | OXYGEN SATURATION: 99 % | DIASTOLIC BLOOD PRESSURE: 76 MMHG | SYSTOLIC BLOOD PRESSURE: 117 MMHG | HEART RATE: 74 BPM | WEIGHT: 173 LBS | RESPIRATION RATE: 16 BRPM | TEMPERATURE: 98.8 F | HEIGHT: 69 IN

## 2025-07-30 DIAGNOSIS — Z51.89 ENCOUNTER FOR WOUND RE-CHECK: Primary | ICD-10-CM

## 2025-07-30 PROCEDURE — 99213 OFFICE O/P EST LOW 20 MIN: CPT | Performed by: NURSE PRACTITIONER

## 2025-07-30 PROCEDURE — 3008F BODY MASS INDEX DOCD: CPT | Performed by: NURSE PRACTITIONER

## 2025-07-30 ASSESSMENT — ENCOUNTER SYMPTOMS
PALPITATIONS: 0
NEUROLOGICAL NEGATIVE: 1
RESPIRATORY NEGATIVE: 1
PSYCHIATRIC NEGATIVE: 1
ALLERGIC/IMMUNOLOGIC NEGATIVE: 1
CONSTITUTIONAL NEGATIVE: 1
MUSCULOSKELETAL NEGATIVE: 1
EYES NEGATIVE: 1
HEMATOLOGIC/LYMPHATIC NEGATIVE: 1
ENDOCRINE NEGATIVE: 1
GASTROINTESTINAL NEGATIVE: 1

## 2025-07-30 ASSESSMENT — PAIN SCALES - GENERAL: PAINLEVEL_OUTOF10: 0-NO PAIN

## 2025-07-30 NOTE — PROGRESS NOTES
"Subjective   Patient ID: Ayesha Jean \"Shelbi\" is a 25 y.o. female. They present today with a chief complaint of Other (Reevaluated of right shoulder tattoo infection from [facility] 10 2025).    History of Present Illness  HPI  Pt presents to urgent care with   Request for wound check.  Patient reports she was treated twice in this urgent care for cellulitis s/p getting a new tattoo.  Reports she is completed 2 full courses of antibiotics.  Reports area seems to be well-healed.  Denies redness, swelling, pain, drainage.  States she just wanted to have it rechecked to make sure it looks okay.  .  Pt denies CP, SOB, palpitations, fevers, abd pain, n/v/d, sick contacts, recent travel.       Past Medical History  Allergies as of 07/30/2025 - Reviewed 07/30/2025   Allergen Reaction Noted    Fluoxetine Other 09/29/2014    Lurasidone Other 07/25/2016    Sertraline Other 09/29/2014    Abreva [docosanol] Unknown 03/09/2023    Betamethasone Other 06/16/2023    Bupropion hcl Unknown 03/09/2023    Codeine Unknown 03/09/2023    Lamotrigine Unknown 05/26/2018    Nortriptyline Unknown 03/09/2023    Paroxetine hcl Unknown 03/09/2023    Quetiapine Hives and Unknown 03/09/2023    Zonisamide Unknown 03/09/2023       Prescriptions Prior to Admission[1]     Medical History[2]    Surgical History[3]     reports that she has quit smoking. Her smoking use included cigarettes. She has never used smokeless tobacco. She reports that she does not currently use alcohol. She reports current drug use. Drug: Marijuana.    Review of Systems  Review of Systems   Constitutional: Negative.    HENT: Negative.     Eyes: Negative.    Respiratory: Negative.     Cardiovascular:  Negative for chest pain and palpitations.   Gastrointestinal: Negative.    Endocrine: Negative.    Genitourinary: Negative.    Musculoskeletal: Negative.    Skin: Negative.    Allergic/Immunologic: Negative.    Neurological: Negative.    Hematological: Negative.  " "  Psychiatric/Behavioral: Negative.     All other systems reviewed and are negative.                                 Objective    Vitals:    07/30/25 1326   BP: 117/76   BP Location: Left arm   Patient Position: Sitting   Pulse: 74   Resp: 16   Temp: 37.1 °C (98.8 °F)   TempSrc: Oral   SpO2: 99%   Weight: 78.5 kg (173 lb)   Height: 1.753 m (5' 9\")     Patient's last menstrual period was 07/03/2025 (exact date).    Physical Exam  Vitals and nursing note reviewed.   Constitutional:       General: She is not in acute distress.     Appearance: Normal appearance. She is not ill-appearing or toxic-appearing.   HENT:      Head: Atraumatic.      Mouth/Throat:      Mouth: Mucous membranes are moist.      Pharynx: Oropharynx is clear.     Eyes:      Extraocular Movements: Extraocular movements intact.      Conjunctiva/sclera: Conjunctivae normal.      Pupils: Pupils are equal, round, and reactive to light.       Cardiovascular:      Rate and Rhythm: Normal rate.   Pulmonary:      Effort: Pulmonary effort is normal.     Skin:     General: Skin is warm and dry.     Neurological:      General: No focal deficit present.      Mental Status: She is alert and oriented to person, place, and time.     Psychiatric:         Mood and Affect: Mood normal.         Behavior: Behavior normal.         Thought Content: Thought content normal.         Procedures    Point of Care Test & Imaging Results from this visit  No results found for this visit on 07/30/25.   Imaging  No results found.    Cardiology, Vascular, and Other Imaging  No other imaging results found for the past 2 days      Diagnostic study results (if any) were reviewed by GUILLERMO Mcginnis.    Assessment/Plan   Allergies, medications, history, and pertinent labs/EKGs/Imaging reviewed by GUILLERMO Mcginnis.     Medical Decision Making    No signs or symptoms of acute cellulitis noted at this time.  Tattoo appears well-healed.  No redness, swelling, drainage, " fevers, body aches, chills, etc. Noted.    Orders and Diagnoses  Diagnoses and all orders for this visit:  Encounter for wound re-check      Medical Admin Record      Patient disposition: Home    Electronically signed by GUILLERMO Mcginnis  2:09 PM           [1] (Not in a hospital admission)   [2]   Past Medical History:  Diagnosis Date    ADHD (attention deficit hyperactivity disorder), combined type 10/21/2013    Anemia     Anxiety     Arthritis     Bipolar I disorder, severe, current or most recent episode depressed, with psychotic features (Multi) 01/07/2018    Borderline personality disorder (Multi) 04/19/2019    Cervicalgia 04/09/2021    Chronic neck and back pain    Cocaine dependence, uncomplicated (Multi) 01/07/2018    Depression     Overdose of tricyclic antidepressants 03/10/2020    PCOS (polycystic ovarian syndrome) 03/19/2023    PMDD (premenstrual dysphoric disorder) 12/04/2014    Prolactinoma (Multi) 03/19/2023    PTSD (post-traumatic stress disorder) 03/19/2023    Schizoaffective disorder, bipolar type (Multi) 11/13/2016    Substance abuse     Suicide attempt (Multi) 08/06/2017    Visual impairment     Wenckebach second degree AV block 05/01/2024   [3]   Past Surgical History:  Procedure Laterality Date    ADENOIDECTOMY      BREAST SURGERY      OTHER SURGICAL HISTORY  05/13/2020    Tonsillectomy    TONSILLECTOMY      WISDOM TOOTH EXTRACTION

## 2025-08-19 ENCOUNTER — APPOINTMENT (OUTPATIENT)
Dept: OBSTETRICS AND GYNECOLOGY | Facility: CLINIC | Age: 25
End: 2025-08-19
Payer: COMMERCIAL

## 2025-09-25 ENCOUNTER — APPOINTMENT (OUTPATIENT)
Dept: OBSTETRICS AND GYNECOLOGY | Facility: CLINIC | Age: 25
End: 2025-09-25
Payer: COMMERCIAL

## 2025-10-23 ENCOUNTER — APPOINTMENT (OUTPATIENT)
Dept: PRIMARY CARE | Facility: CLINIC | Age: 25
End: 2025-10-23
Payer: COMMERCIAL

## 2025-12-17 ENCOUNTER — APPOINTMENT (OUTPATIENT)
Dept: OBSTETRICS AND GYNECOLOGY | Facility: CLINIC | Age: 25
End: 2025-12-17
Payer: COMMERCIAL